# Patient Record
Sex: FEMALE | Race: WHITE | NOT HISPANIC OR LATINO | Employment: OTHER | URBAN - METROPOLITAN AREA
[De-identification: names, ages, dates, MRNs, and addresses within clinical notes are randomized per-mention and may not be internally consistent; named-entity substitution may affect disease eponyms.]

---

## 2022-10-19 PROBLEM — C54.1 ENDOMETRIAL CANCER: Status: ACTIVE | Noted: 2022-10-19

## 2022-10-19 NOTE — PROGRESS NOTES
New Patient Office Visit      Patient Name: Charanjit Cole  : 1955   MRN: [id number]      Referring Physician: Chel Mchugh DO    Chief Complaint:  Endometrial cancer    History of Present Illness: Charanjit Cole is a 66 y.o. female who is here today as a consultation with gynecologic oncology for a new diagnosis of endometrial cancer. She presented to Dr. Mchugh with complaints of a about a year long history of postmenopausal bleeding. Imaging demonstrated a thickened endometrial stripe. A D&C was performed that demonstrated the aforementioned diagnosis. Today, she is doing well. She does continue to have some pink spotting and vaginal discharge. She is otherwise without complaints.    Oncologic History:  Oncology/Hematology History   Endometrial cancer (HCC)   10/19/2022 Initial Diagnosis    Referred by Dr. Chel Mchugh    2022: Endometrial curettings with focal FIGO grade 1 endometrioid adenocarcinoma in a background of complex atypical hyperplasia.          Past Medical History:   Past Medical History:   Diagnosis Date   • Breast cancer (HCC)    • Endometrial cancer (HCC)    • Hypertension    • Palpitation    History of breast cancer treated with lumpectomy , mastectomy in  for reucrrence    Past Surgical History:   Past Surgical History:   Procedure Laterality Date   • BREAST LUMPECTOMY     • CARPAL TUNNEL RELEASE     • MASTECTOMY     • TUBAL ABDOMINAL LIGATION         Family History:   Family History   Problem Relation Age of Onset   • Stroke Mother    • Cardiomyopathy Brother    • Heart attack Brother    • Hodgkin's lymphoma Brother         non   • Stroke Sister        Social History:   Social History     Socioeconomic History   • Marital status:    Tobacco Use   • Smoking status: Never   • Smokeless tobacco: Never       Past OB/GYN History:   OB History   No obstetric history on file.   Menarche 11, menopause 53  ,   Denies any history of abnormal paps    Health  "Maintenance:   Mammogram: Date: History of breast cancer, last mammo 2021  Colonoscopy: Date: Unsure of dates, normal per patient    Medications:     Current Outpatient Medications:   •  citalopram (CeleXA) 20 MG tablet, , Disp: , Rfl:   •  digoxin (LANOXIN) 125 MCG tablet, , Disp: , Rfl:   •  donepezil (ARICEPT) 10 MG tablet, , Disp: , Rfl:   •  losartan (COZAAR) 100 MG tablet, Take 1 tablet by mouth Daily., Disp: , Rfl:   •  Magnesium Oxide 500 MG tablet, , Disp: , Rfl:   •  pantoprazole (PROTONIX) 40 MG EC tablet, , Disp: , Rfl:   •  potassium chloride 10 MEQ CR tablet, , Disp: , Rfl:   •  simvastatin (ZOCOR) 10 MG tablet, Take 1 tablet by mouth Every Night., Disp: , Rfl:   •  vitamin D (ERGOCALCIFEROL) 1.25 MG (68945 UT) capsule capsule, , Disp: , Rfl:     Allergies:   No Known Allergies    Review of Systems:   As per HPI, otherwise negative except chronic palpitations, swelling    Objective     Physical Exam:  Vital Signs:   Vitals:    10/20/22 1304   BP: 167/67   Pulse: 55   Resp: 15   Temp: 97.3 °F (36.3 °C)   TempSrc: Temporal   SpO2: 97%   Weight: 100 kg (220 lb 8 oz)   Height: 167.6 cm (66\")   PainSc: 0-No pain     BMI: Body mass index is 35.59 kg/m².   ECOG score: 0           PHQ-2 Depression Screening  Little interest or pleasure in doing things?     Feeling down, depressed, or hopeless?     PHQ-2 Total Score       Physical Examination:   General appearance - alert, well appearing, and in no distress and oriented to person, place, and time  Mental status - normal mood, behavior, speech, dress, motor activity, and thought processes  Eyes - sclera anicteric, left eye normal, right eye normal  Ears - right ear normal, left ear normal  Nose - mask  Mouth - mask  Lymphatics - no palpable lymphadenopathy, no hepatosplenomegaly  Lungs - normal respiratory effort, clear to auscultation  Heart - bradycardic, regular rhythm, normal S1, S2, no murmurs, rubs, clicks or gallops  Abdomen - obese, soft, nontender, " nondistended, no masses or organomegaly  Pelvic - external genitalia normal, vagina with moderate amount of discharge, cervix normal and without lesions, uterus small and mobile, adnexa without masses  Neurological - alert, oriented, normal speech, no focal findings or movement disorder noted  Musculoskeletal - no joint tenderness, deformity or swelling  Extremities - peripheral pulses normal, no pedal edema, no clubbing or cyanosis  Skin - normal coloration and turgor, no rashes, no suspicious skin lesions noted     Assessment / Plan    Charanjit Cole is a 66 y.o. year old female who is recently diagnosed with FIGO grade 1 endometrial cancer. We discussed surgical management via robotic hysterectomy, bilateral salpingo-oophorectomy with sentinel lymph node evaluation +/- complete lymphadenectomy as indicated. The patient was also counseled extensively on the nature of endometrial cancer and the fact that a majority are found in the early stages. However, if more advanced disease is encountered, she may require chemotherapy, radiation or a combination of the two. We also discussed risks of the procedure including bleeding, infection, wound breakdown, blood clots, injury to surrounding organs including the bowel, bladder, blood vessels, nerves, and ureters requiring additional intervention or procedures. We also discussed the risk of development of lymphedema particularly if a complete lymphadenectomy is needed. and the need for a laparotomy if the surgery cannot be performed safely via the minimally invasive approach. We also discussed the anticipated hospital stay and recovery time. All of the patient's questions were answered satisfactorily and verbal consent was obtained.    Anxiety: Continue citalopram perioperatively  Palpitations: Continue digoxin perioperatively. Will need EKG.  Hypertension: Severe range pressures today. Currently asymptomatic. Will need to hold losartan on day of surgery.  Cognitive changes:  Continue home donepezil.    Diagnoses and all orders for this visit:    1. Endometrial cancer (HCC) (Primary)  -     Case Request; Standing  -     CBC and Differential; Future  -     Comprehensive metabolic panel; Future  -     Type and screen; Future  -     ECG 12 Lead; Future  -     XR chest 1 vw; Future  -     Case Request    2. Anxiety    3. Primary hypertension    4. Cognitive changes    5. Palpitations    Other orders  -     Follow anesthesia standing orders.; Future  -     Obtain Informed Consent; Future  -     Provide NPO Instructions to Patient; Future  -     Chlorhexidine Skin Prep; Future  -     Follow anesthesia standing orders.; Standing  -     Obtain Informed Consent; Standing  -     Verify / Perform Chlorhexidine Skin Prep; Standing  -     Verify / Perform Chlorhexidine Skin Prep if Indicated (If Not Already Completed); Standing  -     lactated ringers infusion  -     ceFAZolin (ANCEF) 2 g in sodium chloride 0.9 % 100 mL IVPB  -     acetaminophen (TYLENOL) tablet 975 mg  -     heparin (porcine) 5000 UNIT/ML injection 5,000 Units  -     Insert Peripheral IV; Standing  -     Saline Lock & Maintain IV Access; Standing  -     sodium chloride 0.9 % flush 3 mL  -     sodium chloride 0.9 % flush 10 mL  -     gabapentin (NEURONTIN) capsule 300 mg  -     oxyCODONE (ROXICODONE) immediate release tablet 5 mg         Follow Up: Surgery    I spent 60 minutes caring for Charanjit on this date of service. This time includes time spent by me in the following activities: preparing for the visit, reviewing tests, performing a medically appropriate examination and/or evaluation, counseling and educating the patient/family/caregiver, ordering medications, tests, or procedures, referring and communicating with other health care professionals, documenting information in the medical record and care coordination      ABILIO Kent MD  Gynecologic Oncology

## 2022-10-20 ENCOUNTER — LAB (OUTPATIENT)
Dept: LAB | Facility: HOSPITAL | Age: 67
End: 2022-10-20

## 2022-10-20 ENCOUNTER — HOSPITAL ENCOUNTER (OUTPATIENT)
Dept: GENERAL RADIOLOGY | Facility: HOSPITAL | Age: 67
Discharge: HOME | End: 2022-10-20

## 2022-10-20 ENCOUNTER — HOSPITAL ENCOUNTER (OUTPATIENT)
Dept: CARDIOLOGY | Facility: HOSPITAL | Age: 67
Discharge: HOME | End: 2022-10-20

## 2022-10-20 ENCOUNTER — OFFICE VISIT (OUTPATIENT)
Dept: GYNECOLOGIC ONCOLOGY | Facility: CLINIC | Age: 67
End: 2022-10-20

## 2022-10-20 ENCOUNTER — TRANSCRIBE ORDERS (OUTPATIENT)
Dept: CARDIOLOGY | Facility: HOSPITAL | Age: 67
End: 2022-10-20

## 2022-10-20 VITALS
OXYGEN SATURATION: 97 % | HEART RATE: 55 BPM | DIASTOLIC BLOOD PRESSURE: 67 MMHG | HEIGHT: 66 IN | WEIGHT: 220.5 LBS | RESPIRATION RATE: 15 BRPM | TEMPERATURE: 97.3 F | BODY MASS INDEX: 35.44 KG/M2 | SYSTOLIC BLOOD PRESSURE: 167 MMHG

## 2022-10-20 DIAGNOSIS — C54.1 ENDOMETRIAL CANCER: Primary | ICD-10-CM

## 2022-10-20 DIAGNOSIS — C54.1 ENDOMETRIAL CANCER: ICD-10-CM

## 2022-10-20 DIAGNOSIS — R41.89 COGNITIVE CHANGES: ICD-10-CM

## 2022-10-20 DIAGNOSIS — F41.9 ANXIETY: ICD-10-CM

## 2022-10-20 DIAGNOSIS — I10 PRIMARY HYPERTENSION: ICD-10-CM

## 2022-10-20 DIAGNOSIS — R00.2 PALPITATIONS: ICD-10-CM

## 2022-10-20 LAB
ALBUMIN SERPL-MCNC: 4 G/DL (ref 3.5–5.2)
ALBUMIN/GLOB SERPL: 1.1 G/DL
ALP SERPL-CCNC: 99 U/L (ref 39–117)
ALT SERPL W P-5'-P-CCNC: 18 U/L (ref 1–33)
ANION GAP SERPL CALCULATED.3IONS-SCNC: 9 MMOL/L (ref 5–15)
AST SERPL-CCNC: 18 U/L (ref 1–32)
BASOPHILS # BLD AUTO: 0.04 10*3/MM3 (ref 0–0.2)
BASOPHILS NFR BLD AUTO: 0.4 % (ref 0–1.5)
BILIRUB SERPL-MCNC: 0.3 MG/DL (ref 0–1.2)
BUN SERPL-MCNC: 15 MG/DL (ref 8–23)
BUN/CREAT SERPL: 13.9 (ref 7–25)
CALCIUM SPEC-SCNC: 9.6 MG/DL (ref 8.6–10.5)
CHLORIDE SERPL-SCNC: 99 MMOL/L (ref 98–107)
CO2 SERPL-SCNC: 32 MMOL/L (ref 22–29)
CREAT SERPL-MCNC: 1.08 MG/DL (ref 0.57–1)
DEPRECATED RDW RBC AUTO: 46.5 FL (ref 37–54)
EGFRCR SERPLBLD CKD-EPI 2021: 56.1 ML/MIN/1.73
EOSINOPHIL # BLD AUTO: 0.17 10*3/MM3 (ref 0–0.4)
EOSINOPHIL NFR BLD AUTO: 1.9 % (ref 0.3–6.2)
ERYTHROCYTE [DISTWIDTH] IN BLOOD BY AUTOMATED COUNT: 13.7 % (ref 12.3–15.4)
GLOBULIN UR ELPH-MCNC: 3.5 GM/DL
GLUCOSE SERPL-MCNC: 183 MG/DL (ref 65–99)
HCT VFR BLD AUTO: 39.5 % (ref 34–46.6)
HGB BLD-MCNC: 12.7 G/DL (ref 12–15.9)
IMM GRANULOCYTES # BLD AUTO: 0.03 10*3/MM3 (ref 0–0.05)
IMM GRANULOCYTES NFR BLD AUTO: 0.3 % (ref 0–0.5)
LYMPHOCYTES # BLD AUTO: 2.55 10*3/MM3 (ref 0.7–3.1)
LYMPHOCYTES NFR BLD AUTO: 28.6 % (ref 19.6–45.3)
MCH RBC QN AUTO: 29.5 PG (ref 26.6–33)
MCHC RBC AUTO-ENTMCNC: 32.2 G/DL (ref 31.5–35.7)
MCV RBC AUTO: 91.9 FL (ref 79–97)
MONOCYTES # BLD AUTO: 0.7 10*3/MM3 (ref 0.1–0.9)
MONOCYTES NFR BLD AUTO: 7.9 % (ref 5–12)
NEUTROPHILS NFR BLD AUTO: 5.42 10*3/MM3 (ref 1.7–7)
NEUTROPHILS NFR BLD AUTO: 60.9 % (ref 42.7–76)
PLATELET # BLD AUTO: 223 10*3/MM3 (ref 140–450)
PMV BLD AUTO: 10 FL (ref 6–12)
POTASSIUM SERPL-SCNC: 4.3 MMOL/L (ref 3.5–5.2)
PROT SERPL-MCNC: 7.5 G/DL (ref 6–8.5)
QT INTERVAL: 396 MS
QTC INTERVAL: 398 MS
RBC # BLD AUTO: 4.3 10*6/MM3 (ref 3.77–5.28)
SODIUM SERPL-SCNC: 140 MMOL/L (ref 136–145)
WBC NRBC COR # BLD: 8.91 10*3/MM3 (ref 3.4–10.8)

## 2022-10-20 PROCEDURE — 93010 ELECTROCARDIOGRAM REPORT: CPT | Performed by: INTERNAL MEDICINE

## 2022-10-20 PROCEDURE — 99204 OFFICE O/P NEW MOD 45 MIN: CPT | Performed by: OBSTETRICS & GYNECOLOGY

## 2022-10-20 PROCEDURE — 80053 COMPREHEN METABOLIC PANEL: CPT

## 2022-10-20 PROCEDURE — 85025 COMPLETE CBC W/AUTO DIFF WBC: CPT

## 2022-10-20 PROCEDURE — 36415 COLL VENOUS BLD VENIPUNCTURE: CPT

## 2022-10-20 PROCEDURE — 71045 X-RAY EXAM CHEST 1 VIEW: CPT

## 2022-10-20 PROCEDURE — 93005 ELECTROCARDIOGRAM TRACING: CPT | Performed by: OBSTETRICS & GYNECOLOGY

## 2022-10-20 RX ORDER — SODIUM CHLORIDE 0.9 % (FLUSH) 0.9 %
3 SYRINGE (ML) INJECTION EVERY 12 HOURS SCHEDULED
Status: CANCELLED | OUTPATIENT
Start: 2022-10-20

## 2022-10-20 RX ORDER — GABAPENTIN 100 MG/1
300 CAPSULE ORAL ONCE
Status: CANCELLED | OUTPATIENT
Start: 2022-10-20 | End: 2022-10-20

## 2022-10-20 RX ORDER — SIMVASTATIN 10 MG
10 TABLET ORAL NIGHTLY
COMMUNITY

## 2022-10-20 RX ORDER — PANTOPRAZOLE SODIUM 40 MG/1
TABLET, DELAYED RELEASE ORAL
COMMUNITY
Start: 2022-09-01

## 2022-10-20 RX ORDER — BACLOFEN 20 MG
TABLET ORAL
COMMUNITY
Start: 2022-09-01

## 2022-10-20 RX ORDER — ERGOCALCIFEROL 1.25 MG/1
CAPSULE ORAL
COMMUNITY
Start: 2022-09-17

## 2022-10-20 RX ORDER — DONEPEZIL HYDROCHLORIDE 10 MG/1
TABLET, FILM COATED ORAL
COMMUNITY
Start: 2022-09-01

## 2022-10-20 RX ORDER — ACETAMINOPHEN 325 MG/1
975 TABLET ORAL ONCE
Status: CANCELLED | OUTPATIENT
Start: 2022-10-20 | End: 2022-10-20

## 2022-10-20 RX ORDER — POTASSIUM CHLORIDE 750 MG/1
TABLET, FILM COATED, EXTENDED RELEASE ORAL
COMMUNITY
Start: 2022-09-01

## 2022-10-20 RX ORDER — LOSARTAN POTASSIUM 100 MG/1
100 TABLET ORAL DAILY
COMMUNITY

## 2022-10-20 RX ORDER — HEPARIN SODIUM 5000 [USP'U]/ML
5000 INJECTION, SOLUTION INTRAVENOUS; SUBCUTANEOUS ONCE
Status: CANCELLED | OUTPATIENT
Start: 2022-10-20 | End: 2022-10-20

## 2022-10-20 RX ORDER — SODIUM CHLORIDE, SODIUM LACTATE, POTASSIUM CHLORIDE, CALCIUM CHLORIDE 600; 310; 30; 20 MG/100ML; MG/100ML; MG/100ML; MG/100ML
100 INJECTION, SOLUTION INTRAVENOUS CONTINUOUS
Status: CANCELLED | OUTPATIENT
Start: 2022-10-20

## 2022-10-20 RX ORDER — OXYCODONE HYDROCHLORIDE 5 MG/1
5 TABLET ORAL ONCE
Status: CANCELLED | OUTPATIENT
Start: 2022-10-20 | End: 2022-10-20

## 2022-10-20 RX ORDER — SODIUM CHLORIDE 0.9 % (FLUSH) 0.9 %
10 SYRINGE (ML) INJECTION AS NEEDED
Status: CANCELLED | OUTPATIENT
Start: 2022-10-20

## 2022-10-20 RX ORDER — DIGOXIN 125 MCG
TABLET ORAL
COMMUNITY
Start: 2022-10-09

## 2022-10-20 RX ORDER — CITALOPRAM 20 MG/1
TABLET ORAL
COMMUNITY
Start: 2022-09-01

## 2022-10-20 NOTE — PATIENT INSTRUCTIONS
"                           Surgery Instructions            Charanjit Cole  [id number]  1955      SURGEON:  Melanie Kent MD    Surgery Coordinator:      If you have any questions before or after surgery please call.  [phone]        Appointment      1. You have pre-admission testing (PAT) appointment on 11/01/2022  at 03:15 pm You will need to be at hospital registration 10 minutes before that time. The registration department is located in the long hallway between the [facility] and [address] [place] [address]. This is on the first floor of the [facility] hospital you can enter through the [address] [place] [address].      2.  Your surgery has been scheduled on 11/04/2022 You will need to go to [facility] to check in at 09:30 am They will then send up to the [address] [place] [address] second floor surgery area.    The Day(s) Before Surgery      Nothing by mouth after midnight on 11/03/2022    Plan to have someone take you home from the hospital.    Do not use any products that contain nicotine or tobacco, such as cigarettes and e-cigarettes. These can delay healing after surgery. If you need help quitting, ask your health care provider.     Do not take vitamins or aspirin one week before surgery ( if applicable). Do not take Ibuprofen or NSAIDs 5 days prior to Surgery. Do not take ACE or ARB medications for blood pressure the morning of surgery. If you are taking insulin, take 1/2 dose insulin the night prior to surgery.   Bring them with you to the hospital (Diabetic patient should bring insulin if instructed by the managing physician). If you are taking a blood thinner ( Eliquis, Coumadin, Xarelto, Lovenox, Asprin, Heparin, etc.) please have the provider that manages this instruct you on when to stop taking prior to surgery.     Continue antidepressants, Beta Blockers \"olol\", anti-seizure medication, GERD medication (heartburn), Opioids and Parkinson's medication.       If you are feeling sick, have a fever or cough and have seen your PCP let our " office know 48 hours prior to surgery. It may be subject to rescheduling.         The Day of Surgery:    Do not chew gum or tobacco, smoke, or eat mints or hard candy. Shower and wash your hair. You may brush your teeth but  do not swallow water. Use any wipes that Pre-admission testing has given you.     Please arrive for surgery as instructed by the pre-op nurse, often one to two hours before your surgery.     Remove all jewelry, including rings and piercings. Do not bring valuables to the hospital.     Wear loose-fitting clothing.     Avoid wearing eye makeup or contact lenses     Please remember to bring:  ? Photo ID and medical insurance card  ? Advanced directives, living will or power of  (if applicable)  ? Current list of all medications, including over-the- counter and herbal supplements  ? List of allergies  ? CPAP device if you have sleep apnea  ? Any assistive devices or equipment needed after surgery  ? Books/magazines to pass the time     When you arrive, check in at the surgery registration desk on the second floor of the [address] [place] [address].      Once you are called to go to your pre-op room, no one will be allowed in the pre op room.     Please note no one under age 12 is permitted to stay in the waiting area without supervision.    We make every effort to begin surgery at your scheduled start time but delays do occur. We will keep you and your family  updated about any delays.    While You are In the Pre-Op Room:   The nurse will review your health history and will place an IV (into the vein) in your hand or arm for fluids and  medicines.   An anesthesia provider will talk with you about anesthesia and pain control during and after surgery.   A member of the surgical team can answer your questions.             What can I expect after the procedure?    After Surgery and/or Discharge:    After surgery you will be moved to the recovery area. Recovery and discharge times will vary depending on the  procedure.    The recovery room nurse will provide your family/visitors with updates. Visitors are not permitted in the immediate postoperative recovery area, but your visitors will be notified when they can come to see you after surgery.    If you will not be admitted to the hospital, your nurse will assess your readiness to go home. This includes your:     Ability to walk, use crutches, etc.   Ability to urinate adequate amounts   Nausea and pain control    Before discharge, you will receive written instructions about how to care for yourself at home along with any prescriptions  for medications.     For your safety, you will need a responsible adult age 18 or older to accompany you home.     Please have a ride arranged and available when you are ready to leave. You cannot leave alone and it is recommended someone stay with you for the first 24 hours after anesthesia.       After the procedure, it is common to have:    Pain.  Soreness and numbness in your incision areas.  Vaginal bleeding and discharge up to 6 weeks after surgery.  Constipation.  Temporary change in bladder function.  Feelings of sadness or other emotions.  Small amounts of clear drainage from incisions  If you are discharged with an abdominal binder, this is to be used as needed for incisional comfort. You may choose to discontinue use at any time.           Follow these instructions at home:  Medicines    Please take any medications that have been prescribed after your surgery, you may take over the counter Tylenol and Ibuprofen, unless told otherwise by your provider.   Please take your stool softener as prescribed. If you do not have a bowel movement within 24 hours following surgery try a laxative (milk of magnesia) or you may take Liz-Lax.    Activity    Return to your normal activities as told by your health care provider.   You may be told to take short walks every day and go farther each time.  Do not lift anything that is heavier than  20 lbs.      General instructions    Do not put anything in your vagina for 6 weeks after your surgery or as told by your health care provider. This includes tampons and douches.  Do not have sex until your health care provider says you can.  Do not take baths, swim, or use a hot tub until your health care provider approves.  Drink enough fluid to keep your urine clear or pale yellow.  Do not drive for 24 hours if you were given a sedative.  Do not drive while taking Narcotic Pain medication ( oxycodone, hydrocodone, etc.).   Keep all follow-up visits as told by your health care provider. This is important. You will have a post op appointment typically 3 weeks after surgery.  Make sure you are urinating on a scheduled basis, for example every 2 hours. This will help retrain your bladder after surgery and prevent urinary tract infections.     Contact a health care provider if:    Your pain medicine is not helping.  You have a fever over 101.0  You have redness, swelling, or pain at your incision site.  You continue to have difficulty urinating.  You have not had a bowel movement 2-3 days after surgery, experience nausea and vomiting, are unable to pass gas.   Large volumes of drainage or blood from incisions, requiring multiple guaze changes.     Get help right away if:    You have severe abdominal or back pain that is not controlled with medication.  You have heavy bleeding from your vagina that saturates a maxi pad within 1-2 hours. Note- vaginal bleeding and spotting is normal up to 6 weeks from a hysterectomy, Heavy Bleeding is not.     If you experience a medical emergency call 911 or have someone drive you to your nearest emergency department.         Parking Information:    Free parking is available for patients and guests. There is  parking at the [address] in front of the hospital. Parking is also available in the [facility] and [facility].         Financial Assistance:    If you have any  questions or need assistance, contact your Psychiatric financial counseling office from 8:30 a.m.-4:30  p.m. Monday through Friday. Closed weekends.     [place]: [phone] or, or visit at [address] [facility], [address], near the entrance.        Patient Payments and Correspondence    Customer service representatives are available to assist you from 8:00 a.m. to 6:00 p.m. Eastern Standard Time by calling [phone] Monday through Friday. You can also contact us through [email or web address].    Psychiatric  [address]  [place] [address] [phone]

## 2022-11-01 ENCOUNTER — APPOINTMENT (OUTPATIENT)
Dept: PREADMISSION TESTING | Facility: HOSPITAL | Age: 67
End: 2022-11-01

## 2022-11-03 ENCOUNTER — ANESTHESIA EVENT (OUTPATIENT)
Dept: PERIOP | Facility: HOSPITAL | Age: 67
End: 2022-11-03

## 2022-11-03 ENCOUNTER — TELEPHONE (OUTPATIENT)
Dept: GYNECOLOGIC ONCOLOGY | Facility: CLINIC | Age: 67
End: 2022-11-03

## 2022-11-03 RX ORDER — FAMOTIDINE 10 MG/ML
20 INJECTION, SOLUTION INTRAVENOUS ONCE
Status: CANCELLED | OUTPATIENT
Start: 2022-11-03 | End: 2022-11-03

## 2022-11-04 ENCOUNTER — HOSPITAL ENCOUNTER (OUTPATIENT)
Facility: HOSPITAL | Age: 67
Setting detail: HOSPITAL OUTPATIENT SURGERY
Discharge: HOME | End: 2022-11-04
Attending: OBSTETRICS & GYNECOLOGY | Admitting: OBSTETRICS & GYNECOLOGY

## 2022-11-04 ENCOUNTER — ANESTHESIA (OUTPATIENT)
Dept: PERIOP | Facility: HOSPITAL | Age: 67
End: 2022-11-04

## 2022-11-04 VITALS
TEMPERATURE: 97.8 F | OXYGEN SATURATION: 92 % | HEART RATE: 66 BPM | HEIGHT: 66 IN | SYSTOLIC BLOOD PRESSURE: 142 MMHG | RESPIRATION RATE: 18 BRPM | BODY MASS INDEX: 35.44 KG/M2 | DIASTOLIC BLOOD PRESSURE: 89 MMHG | WEIGHT: 220.5 LBS

## 2022-11-04 DIAGNOSIS — C54.1 ENDOMETRIAL CANCER: ICD-10-CM

## 2022-11-04 LAB
GLUCOSE BLDC GLUCOMTR-MCNC: 287 MG/DL (ref 70–130)
GLUCOSE BLDC GLUCOMTR-MCNC: 325 MG/DL (ref 70–130)
GLUCOSE BLDC GLUCOMTR-MCNC: 370 MG/DL (ref 70–130)

## 2022-11-04 PROCEDURE — 94799 UNLISTED PULMONARY SVC/PX: CPT

## 2022-11-04 PROCEDURE — 25010000002 HEPARIN (PORCINE) PER 1000 UNITS: Performed by: OBSTETRICS & GYNECOLOGY

## 2022-11-04 PROCEDURE — 58571 TLH W/T/O 250 G OR LESS: CPT | Performed by: OBSTETRICS & GYNECOLOGY

## 2022-11-04 PROCEDURE — 58571 TLH W/T/O 250 G OR LESS: CPT | Performed by: PHYSICIAN ASSISTANT

## 2022-11-04 PROCEDURE — 88381 MICRODISSECTION MANUAL: CPT

## 2022-11-04 PROCEDURE — 94640 AIRWAY INHALATION TREATMENT: CPT

## 2022-11-04 PROCEDURE — 38900 IO MAP OF SENT LYMPH NODE: CPT | Performed by: OBSTETRICS & GYNECOLOGY

## 2022-11-04 PROCEDURE — 88309 TISSUE EXAM BY PATHOLOGIST: CPT | Performed by: OBSTETRICS & GYNECOLOGY

## 2022-11-04 PROCEDURE — 25010000002 CEFAZOLIN PER 500 MG: Performed by: OBSTETRICS & GYNECOLOGY

## 2022-11-04 PROCEDURE — 88305 TISSUE EXAM BY PATHOLOGIST: CPT | Performed by: OBSTETRICS & GYNECOLOGY

## 2022-11-04 PROCEDURE — 63710000001 INSULIN LISPRO (HUMAN) PER 5 UNITS

## 2022-11-04 PROCEDURE — 25010000002 FENTANYL CITRATE (PF) 100 MCG/2ML SOLUTION: Performed by: NURSE ANESTHETIST, CERTIFIED REGISTERED

## 2022-11-04 PROCEDURE — 63710000001 INSULIN REGULAR HUMAN PER 5 UNITS: Performed by: ANESTHESIOLOGY

## 2022-11-04 PROCEDURE — 38571 LAPAROSCOPY LYMPHADENECTOMY: CPT | Performed by: PHYSICIAN ASSISTANT

## 2022-11-04 PROCEDURE — 25010000002 NALOXONE PER 1 MG: Performed by: NURSE ANESTHETIST, CERTIFIED REGISTERED

## 2022-11-04 PROCEDURE — 82962 GLUCOSE BLOOD TEST: CPT

## 2022-11-04 PROCEDURE — 88342 IMHCHEM/IMCYTCHM 1ST ANTB: CPT | Performed by: OBSTETRICS & GYNECOLOGY

## 2022-11-04 PROCEDURE — 25010000002 HYDROMORPHONE 1 MG/ML SOLUTION

## 2022-11-04 PROCEDURE — 25010000002 PROPOFOL 10 MG/ML EMULSION: Performed by: NURSE ANESTHETIST, CERTIFIED REGISTERED

## 2022-11-04 PROCEDURE — 38900 IO MAP OF SENT LYMPH NODE: CPT | Performed by: PHYSICIAN ASSISTANT

## 2022-11-04 PROCEDURE — 88341 IMHCHEM/IMCYTCHM EA ADD ANTB: CPT | Performed by: OBSTETRICS & GYNECOLOGY

## 2022-11-04 PROCEDURE — 38571 LAPAROSCOPY LYMPHADENECTOMY: CPT | Performed by: OBSTETRICS & GYNECOLOGY

## 2022-11-04 PROCEDURE — 88307 TISSUE EXAM BY PATHOLOGIST: CPT | Performed by: OBSTETRICS & GYNECOLOGY

## 2022-11-04 PROCEDURE — 25010000002 DEXAMETHASONE PER 1 MG: Performed by: NURSE ANESTHETIST, CERTIFIED REGISTERED

## 2022-11-04 PROCEDURE — 25010000002 FENTANYL CITRATE (PF) 50 MCG/ML SOLUTION

## 2022-11-04 DEVICE — FLOSEAL HEMOSTATIC MATRIX, 5ML
Type: IMPLANTABLE DEVICE | Site: ABDOMEN | Status: FUNCTIONAL
Brand: FLOSEAL HEMOSTATIC MATRIX

## 2022-11-04 RX ORDER — FENTANYL CITRATE 50 UG/ML
INJECTION, SOLUTION INTRAMUSCULAR; INTRAVENOUS AS NEEDED
Status: DISCONTINUED | OUTPATIENT
Start: 2022-11-04 | End: 2022-11-04 | Stop reason: SURG

## 2022-11-04 RX ORDER — INDOCYANINE GREEN AND WATER 25 MG
KIT INJECTION AS NEEDED
Status: DISCONTINUED | OUTPATIENT
Start: 2022-11-04 | End: 2022-11-04 | Stop reason: HOSPADM

## 2022-11-04 RX ORDER — PROPOFOL 10 MG/ML
VIAL (ML) INTRAVENOUS AS NEEDED
Status: DISCONTINUED | OUTPATIENT
Start: 2022-11-04 | End: 2022-11-04 | Stop reason: SURG

## 2022-11-04 RX ORDER — MIDAZOLAM HYDROCHLORIDE 1 MG/ML
0.5 INJECTION INTRAMUSCULAR; INTRAVENOUS
Status: DISCONTINUED | OUTPATIENT
Start: 2022-11-04 | End: 2022-11-04 | Stop reason: HOSPADM

## 2022-11-04 RX ORDER — HEPARIN SODIUM 5000 [USP'U]/ML
5000 INJECTION, SOLUTION INTRAVENOUS; SUBCUTANEOUS ONCE
Status: COMPLETED | OUTPATIENT
Start: 2022-11-04 | End: 2022-11-04

## 2022-11-04 RX ORDER — SODIUM CHLORIDE 0.9 % (FLUSH) 0.9 %
10 SYRINGE (ML) INJECTION EVERY 12 HOURS SCHEDULED
Status: DISCONTINUED | OUTPATIENT
Start: 2022-11-04 | End: 2022-11-04 | Stop reason: HOSPADM

## 2022-11-04 RX ORDER — DEXAMETHASONE SODIUM PHOSPHATE 4 MG/ML
INJECTION, SOLUTION INTRA-ARTICULAR; INTRALESIONAL; INTRAMUSCULAR; INTRAVENOUS; SOFT TISSUE AS NEEDED
Status: DISCONTINUED | OUTPATIENT
Start: 2022-11-04 | End: 2022-11-04 | Stop reason: SURG

## 2022-11-04 RX ORDER — INSULIN LISPRO 100 [IU]/ML
INJECTION, SOLUTION INTRAVENOUS; SUBCUTANEOUS
Status: COMPLETED
Start: 2022-11-04 | End: 2022-11-04

## 2022-11-04 RX ORDER — HYDROMORPHONE HYDROCHLORIDE 1 MG/ML
0.5 INJECTION, SOLUTION INTRAMUSCULAR; INTRAVENOUS; SUBCUTANEOUS
Status: DISCONTINUED | OUTPATIENT
Start: 2022-11-04 | End: 2022-11-04 | Stop reason: HOSPADM

## 2022-11-04 RX ORDER — SODIUM CHLORIDE, SODIUM LACTATE, POTASSIUM CHLORIDE, CALCIUM CHLORIDE 600; 310; 30; 20 MG/100ML; MG/100ML; MG/100ML; MG/100ML
9 INJECTION, SOLUTION INTRAVENOUS CONTINUOUS
Status: DISCONTINUED | OUTPATIENT
Start: 2022-11-04 | End: 2022-11-04 | Stop reason: HOSPADM

## 2022-11-04 RX ORDER — ACETAMINOPHEN 325 MG/1
650 TABLET ORAL EVERY 4 HOURS PRN
Qty: 60 TABLET | Refills: 0 | Status: SHIPPED | OUTPATIENT
Start: 2022-11-04

## 2022-11-04 RX ORDER — PROMETHAZINE HYDROCHLORIDE 12.5 MG/1
12.5 TABLET ORAL ONCE AS NEEDED
Status: DISCONTINUED | OUTPATIENT
Start: 2022-11-04 | End: 2022-11-04 | Stop reason: HOSPADM

## 2022-11-04 RX ORDER — GLYCOPYRROLATE 0.2 MG/ML
INJECTION INTRAMUSCULAR; INTRAVENOUS AS NEEDED
Status: DISCONTINUED | OUTPATIENT
Start: 2022-11-04 | End: 2022-11-04 | Stop reason: SURG

## 2022-11-04 RX ORDER — FENTANYL CITRATE 50 UG/ML
50 INJECTION, SOLUTION INTRAMUSCULAR; INTRAVENOUS
Status: DISCONTINUED | OUTPATIENT
Start: 2022-11-04 | End: 2022-11-04 | Stop reason: HOSPADM

## 2022-11-04 RX ORDER — ACETAMINOPHEN 325 MG/1
975 TABLET ORAL ONCE
Status: COMPLETED | OUTPATIENT
Start: 2022-11-04 | End: 2022-11-04

## 2022-11-04 RX ORDER — BUPIVACAINE HYDROCHLORIDE AND EPINEPHRINE 5; 5 MG/ML; UG/ML
INJECTION, SOLUTION PERINEURAL AS NEEDED
Status: DISCONTINUED | OUTPATIENT
Start: 2022-11-04 | End: 2022-11-04 | Stop reason: HOSPADM

## 2022-11-04 RX ORDER — ROCURONIUM BROMIDE 10 MG/ML
INJECTION, SOLUTION INTRAVENOUS AS NEEDED
Status: DISCONTINUED | OUTPATIENT
Start: 2022-11-04 | End: 2022-11-04 | Stop reason: SURG

## 2022-11-04 RX ORDER — SODIUM CHLORIDE 9 MG/ML
INJECTION, SOLUTION INTRAVENOUS AS NEEDED
Status: DISCONTINUED | OUTPATIENT
Start: 2022-11-04 | End: 2022-11-04 | Stop reason: HOSPADM

## 2022-11-04 RX ORDER — LIDOCAINE HYDROCHLORIDE 10 MG/ML
0.5 INJECTION, SOLUTION EPIDURAL; INFILTRATION; INTRACAUDAL; PERINEURAL ONCE AS NEEDED
Status: COMPLETED | OUTPATIENT
Start: 2022-11-04 | End: 2022-11-04

## 2022-11-04 RX ORDER — FENTANYL CITRATE 50 UG/ML
INJECTION, SOLUTION INTRAMUSCULAR; INTRAVENOUS
Status: COMPLETED
Start: 2022-11-04 | End: 2022-11-04

## 2022-11-04 RX ORDER — ALBUTEROL SULFATE 2.5 MG/3ML
2.5 SOLUTION RESPIRATORY (INHALATION) ONCE AS NEEDED
Status: DISCONTINUED | OUTPATIENT
Start: 2022-11-04 | End: 2022-11-04 | Stop reason: HOSPADM

## 2022-11-04 RX ORDER — ONDANSETRON 2 MG/ML
4 INJECTION INTRAMUSCULAR; INTRAVENOUS ONCE AS NEEDED
Status: DISCONTINUED | OUTPATIENT
Start: 2022-11-04 | End: 2022-11-04 | Stop reason: HOSPADM

## 2022-11-04 RX ORDER — ONDANSETRON 4 MG/1
4 TABLET, FILM COATED ORAL ONCE AS NEEDED
Status: DISCONTINUED | OUTPATIENT
Start: 2022-11-04 | End: 2022-11-04 | Stop reason: HOSPADM

## 2022-11-04 RX ORDER — ALBUTEROL SULFATE 2.5 MG/3ML
2.5 SOLUTION RESPIRATORY (INHALATION) ONCE
Status: COMPLETED | OUTPATIENT
Start: 2022-11-04 | End: 2022-11-04

## 2022-11-04 RX ORDER — OXYCODONE HYDROCHLORIDE 5 MG/1
5 TABLET ORAL ONCE
Status: COMPLETED | OUTPATIENT
Start: 2022-11-04 | End: 2022-11-04

## 2022-11-04 RX ORDER — GABAPENTIN 300 MG/1
300 CAPSULE ORAL ONCE
Status: COMPLETED | OUTPATIENT
Start: 2022-11-04 | End: 2022-11-04

## 2022-11-04 RX ORDER — DOCUSATE SODIUM 250 MG
250 CAPSULE ORAL 2 TIMES DAILY
Qty: 60 CAPSULE | Refills: 0 | Status: SHIPPED | OUTPATIENT
Start: 2022-11-04

## 2022-11-04 RX ORDER — OXYCODONE HYDROCHLORIDE AND ACETAMINOPHEN 5; 325 MG/1; MG/1
1 TABLET ORAL EVERY 4 HOURS PRN
Qty: 5 TABLET | Refills: 0 | Status: SHIPPED | OUTPATIENT
Start: 2022-11-04

## 2022-11-04 RX ORDER — FAMOTIDINE 20 MG/1
20 TABLET, FILM COATED ORAL ONCE
Status: COMPLETED | OUTPATIENT
Start: 2022-11-04 | End: 2022-11-04

## 2022-11-04 RX ORDER — NALOXONE HYDROCHLORIDE 0.4 MG/ML
INJECTION, SOLUTION INTRAMUSCULAR; INTRAVENOUS; SUBCUTANEOUS AS NEEDED
Status: DISCONTINUED | OUTPATIENT
Start: 2022-11-04 | End: 2022-11-04 | Stop reason: SURG

## 2022-11-04 RX ORDER — ALBUTEROL SULFATE 2.5 MG/3ML
SOLUTION RESPIRATORY (INHALATION)
Status: COMPLETED
Start: 2022-11-04 | End: 2022-11-04

## 2022-11-04 RX ORDER — IBUPROFEN 600 MG/1
600 TABLET ORAL EVERY 6 HOURS PRN
Status: DISCONTINUED | OUTPATIENT
Start: 2022-11-04 | End: 2022-11-04 | Stop reason: HOSPADM

## 2022-11-04 RX ORDER — ASPIRIN 81 MG/1
81 TABLET, CHEWABLE ORAL DAILY
COMMUNITY

## 2022-11-04 RX ORDER — SODIUM CHLORIDE 0.9 % (FLUSH) 0.9 %
10 SYRINGE (ML) INJECTION AS NEEDED
Status: DISCONTINUED | OUTPATIENT
Start: 2022-11-04 | End: 2022-11-04 | Stop reason: HOSPADM

## 2022-11-04 RX ORDER — PROMETHAZINE HYDROCHLORIDE 12.5 MG/1
12.5 SUPPOSITORY RECTAL ONCE AS NEEDED
Status: DISCONTINUED | OUTPATIENT
Start: 2022-11-04 | End: 2022-11-04 | Stop reason: HOSPADM

## 2022-11-04 RX ORDER — BUPIVACAINE HCL/0.9 % NACL/PF 0.1 %
2 PLASTIC BAG, INJECTION (ML) EPIDURAL ONCE
Status: COMPLETED | OUTPATIENT
Start: 2022-11-04 | End: 2022-11-04

## 2022-11-04 RX ORDER — MAGNESIUM HYDROXIDE 1200 MG/15ML
LIQUID ORAL AS NEEDED
Status: DISCONTINUED | OUTPATIENT
Start: 2022-11-04 | End: 2022-11-04 | Stop reason: HOSPADM

## 2022-11-04 RX ORDER — LIDOCAINE HYDROCHLORIDE 10 MG/ML
INJECTION, SOLUTION EPIDURAL; INFILTRATION; INTRACAUDAL; PERINEURAL AS NEEDED
Status: DISCONTINUED | OUTPATIENT
Start: 2022-11-04 | End: 2022-11-04 | Stop reason: SURG

## 2022-11-04 RX ADMIN — HEPARIN SODIUM 5000 UNITS: 5000 INJECTION, SOLUTION INTRAVENOUS; SUBCUTANEOUS at 08:43

## 2022-11-04 RX ADMIN — OXYCODONE 5 MG: 5 TABLET ORAL at 08:40

## 2022-11-04 RX ADMIN — FENTANYL CITRATE 50 MCG: 50 INJECTION, SOLUTION INTRAMUSCULAR; INTRAVENOUS at 13:10

## 2022-11-04 RX ADMIN — HYDROMORPHONE HYDROCHLORIDE 0.5 MG: 1 INJECTION, SOLUTION INTRAMUSCULAR; INTRAVENOUS; SUBCUTANEOUS at 13:25

## 2022-11-04 RX ADMIN — PROPOFOL 150 MG: 10 INJECTION, EMULSION INTRAVENOUS at 09:37

## 2022-11-04 RX ADMIN — FENTANYL CITRATE 50 MCG: 50 INJECTION, SOLUTION INTRAMUSCULAR; INTRAVENOUS at 13:35

## 2022-11-04 RX ADMIN — GLYCOPYRROLATE 0.2 MCG: 0.2 INJECTION INTRAMUSCULAR; INTRAVENOUS at 09:57

## 2022-11-04 RX ADMIN — ROCURONIUM BROMIDE 50 MG: 10 INJECTION INTRAVENOUS at 09:37

## 2022-11-04 RX ADMIN — FAMOTIDINE 20 MG: 20 TABLET ORAL at 08:40

## 2022-11-04 RX ADMIN — INSULIN LISPRO 4 UNITS: 100 INJECTION, SOLUTION INTRAVENOUS; SUBCUTANEOUS at 12:24

## 2022-11-04 RX ADMIN — ROCURONIUM BROMIDE 20 MG: 10 INJECTION INTRAVENOUS at 10:16

## 2022-11-04 RX ADMIN — ACETAMINOPHEN 1000 MG: 500 TABLET ORAL at 08:39

## 2022-11-04 RX ADMIN — INSULIN HUMAN 5 UNITS: 100 INJECTION, SOLUTION PARENTERAL at 08:44

## 2022-11-04 RX ADMIN — HYDROMORPHONE HYDROCHLORIDE 0.5 MG: 1 INJECTION, SOLUTION INTRAMUSCULAR; INTRAVENOUS; SUBCUTANEOUS at 13:18

## 2022-11-04 RX ADMIN — LIDOCAINE HYDROCHLORIDE 50 MG: 10 INJECTION, SOLUTION EPIDURAL; INFILTRATION; INTRACAUDAL; PERINEURAL at 09:37

## 2022-11-04 RX ADMIN — SODIUM CHLORIDE, POTASSIUM CHLORIDE, SODIUM LACTATE AND CALCIUM CHLORIDE 500 ML: 600; 310; 30; 20 INJECTION, SOLUTION INTRAVENOUS at 12:59

## 2022-11-04 RX ADMIN — SODIUM CHLORIDE, POTASSIUM CHLORIDE, SODIUM LACTATE AND CALCIUM CHLORIDE 9 ML/HR: 600; 310; 30; 20 INJECTION, SOLUTION INTRAVENOUS at 08:15

## 2022-11-04 RX ADMIN — NALXONE HYDROCHLORIDE 0.08 MG: 0.4 INJECTION INTRAMUSCULAR; INTRAVENOUS; SUBCUTANEOUS at 11:42

## 2022-11-04 RX ADMIN — ALBUTEROL SULFATE 2.5 MG: 2.5 SOLUTION RESPIRATORY (INHALATION) at 14:17

## 2022-11-04 RX ADMIN — GLYCOPYRROLATE 0.2 MCG: 0.2 INJECTION INTRAMUSCULAR; INTRAVENOUS at 10:01

## 2022-11-04 RX ADMIN — DEXAMETHASONE SODIUM PHOSPHATE 8 MG: 4 INJECTION, SOLUTION INTRAMUSCULAR; INTRAVENOUS at 09:37

## 2022-11-04 RX ADMIN — SODIUM CHLORIDE, POTASSIUM CHLORIDE, SODIUM LACTATE AND CALCIUM CHLORIDE: 600; 310; 30; 20 INJECTION, SOLUTION INTRAVENOUS at 10:28

## 2022-11-04 RX ADMIN — LIDOCAINE HYDROCHLORIDE 0.2 ML: 10 INJECTION, SOLUTION EPIDURAL; INFILTRATION; INTRACAUDAL; PERINEURAL at 08:15

## 2022-11-04 RX ADMIN — FENTANYL CITRATE 100 MCG: 50 INJECTION, SOLUTION INTRAMUSCULAR; INTRAVENOUS at 09:37

## 2022-11-04 RX ADMIN — GABAPENTIN 300 MG: 300 CAPSULE ORAL at 08:40

## 2022-11-04 RX ADMIN — Medication 2 G: at 09:37

## 2022-11-04 NOTE — INTERVAL H&P NOTE
[facility] Pre-op    Full history and physical note from office is attached.    VS: /92  HR 64  RR 16  T 97.0  Sat 93%RA    Immunizations:  Influenza:  No  Pneumococcal:  No  Tetanus:  UTD  Covid x3: 2021    Review of Systems:  Constitutional-- No fever, chills or sweats. No fatigue.  CV-- No chest pain, palpitation or syncope  Resp-- No SOB, cough, hemoptysis  Skin--No rashes or lesions    LAB Results:  Lab Results   Component Value Date    WBC 8.91 10/20/2022    HGB 12.7 10/20/2022    HCT 39.5 10/20/2022    MCV 91.9 10/20/2022     10/20/2022    NEUTROABS 5.42 10/20/2022    GLUCOSE 183 (H) 10/20/2022    BUN 15 10/20/2022    CREATININE 1.08 (H) 10/20/2022     10/20/2022    K 4.3 10/20/2022    CL 99 10/20/2022    CO2 32.0 (H) 10/20/2022    CALCIUM 9.6 10/20/2022    ALBUMIN 4.00 10/20/2022    AST 18 10/20/2022    ALT 18 10/20/2022    BILITOT 0.3 10/20/2022       Cancer Staging (if applicable)  Cancer Patient: __ yes __no __unknown__N/A; If yes, clinical stage T:__ N:__M:__, stage group or __N/A      Impression: Endometrial cancer      Plan: TOTAL LAPAROSCOPIC HYSTERECTOMY BILATERAL SALPINGO-OOPHORECTOMY, INJECTION FOR SENTINEL LYMPH NODE MAPPING, BILATERAL SENTINEL LYMPH NODE DISSECTION (POSSIBLE FULL LYMPH NODE DISSECTION) WITH DAVINCI ROBOT      GUY Dudley   11/4/2022   08:02 EDT

## 2022-11-04 NOTE — ANESTHESIA PREPROCEDURE EVALUATION
Anesthesia Evaluation     Patient summary reviewed and Nursing notes reviewed   no history of anesthetic complications:  NPO Solid Status: > 8 hours  NPO Liquid Status: > 2 hours           Airway   Mallampati: II  TM distance: >3 FB  Neck ROM: full  Small opening and Possible difficult intubation  Dental - normal exam     Pulmonary - normal exam   (-) sleep apnea, not a smoker  Cardiovascular   Exercise tolerance: good (4-7 METS)    ECG reviewed  Rhythm: regular  Rate: normal    (+) hypertension, dysrhythmias Paroxysmal Atrial Fib, murmur,   (-) past MI, CAD, cardiac stents    ROS comment: NSR     Neuro/Psych  (-) seizures, CVA  GI/Hepatic/Renal/Endo    (+)  GERD well controlled,      Musculoskeletal     Abdominal    Substance History - negative use     OB/GYN          Other      history of cancer (endometrial ca)    ROS/Med Hx Other: 9[facility]20[id number]  bg [facility] --> insulin now  hgb 12.7 k 4.3                Anesthesia Plan    ASA 3     general     (Risks and benefits of general anesthesia discussed with patient (including MI, CVA, death, recall, aspiration, oropharyngeal/dental damage), questions answered, agreeable to proceed.    )  intravenous induction     Anesthetic plan, risks, benefits, and alternatives have been provided, discussed and informed consent has been obtained with: patient.    Use of blood products discussed with patient  Consented to blood products.   Plan discussed with CRNA.        CODE STATUS:

## 2022-11-04 NOTE — OP NOTE
Operative Note     Patient Name: Charanjit Cole  : 1955   MRN: [id number]   Date: 22  Time: 11: EDT    Date of Service:  22  Time of Service:  11: EDT    Surgical Staff: Surgeon(s) and Role:     * Melanie Kent MD - Primary     * Yair Hermosillo MD - Resident - Assisting   Additional Staff:   Assistant: Kevin Boyd PA-C; German Morales PA-C  was responsible for performing the following activities: Retraction, Suction, Irrigation, Suturing, Closing, Placing Dressing and Held/Positioned Camera and their skilled assistance was necessary for the success of this case.     Pre-operative diagnosis(es): Pre-Op Diagnosis Codes:     * Endometrial cancer (HCC) [C54.1]     Post-operative diagnosis(es): Post-Op Diagnosis Codes:     * Endometrial cancer (HCC) [C54.1]   Procedure(s): Procedure(s):  TOTAL LAPAROSCOPIC HYSTERECTOMY BILATERAL SALPINGO-OOPHORECTOMY, INJECTION FOR SENTINEL LYMPH NODE MAPPING, BILATERAL PELVIC SENTINEL LYMPH NODE DISSECTION  WITH DAVINCI ROBOT     Antibiotics: cefazolin (Ancef) ordered on call to OR     Anesthesia: Type: General  ASA:  III     Objective      Operative findings: Normal appearing cervix. Uterus sounded to 8 cm. Normal-appearing bilateral adnexa. No evidence of extrauterine disease. Bilateral sentinel lymph node mapping to the external iliac spaces. Upper abdominal survey normal.   Specimens removed: ID Type Source Tests Collected by Time   A (Not marked as sent) : RIGHT PELVIC SENTINEL LYMPH NODE FOR PERMANENT Tissue Pelvis TISSUE PATHOLOGY EXAM Melanie Kent MD 2022 1016   B (Not marked as sent) : LEFT PELVIC SENTINEL LYMPH NODE FOR PERMANENT Tissue Pelvis TISSUE PATHOLOGY EXAM Melanie Kent MD 2022 1016   C (Not marked as sent) :  Tissue Uterus with Cervix, Bilateral Tubes and Ovaries TISSUE PATHOLOGY EXAM Melanie Kent MD 2022 1008   D (Not marked as sent) : RIGHT CERVICAL MARGIN FOR PERMANENT Tissue Cervix TISSUE  PATHOLOGY EXAM Melanie Kent MD 11/4/2022 1059      Fluid Intake and Output: I/O this shift:  In: 1000 [I.V.:1000]  Out: -  EBL 50 cc   Blood products used: No   Drains: Urethral Catheter Silicone 16 Fr. (Active)      Implant Information: Nothing was implanted during the procedure   Complications: None   Intraoperative consult(s): None   Condition: stable   Disposition: to PACU and then discharge home     INDICATIONS FOR PROCEDURE:  Charanjit Cole is a 67 y.o. female with a history of postmenopausal bleeding and uterine sampling demonstrating FIGO grade endometrial cancer. She was counseled regarding options and she desired to proceed with hysterectomy, BSO and staging by a robotic approach. Questions were answered and consent was signed.      DESCRIPTION OF PROCEDURE:  The patient was taken to the operating room after the sites had been marked. After a time out procedure was performed, and the planned procedure and patient were confirmed, she was placed under general anesthesia with endotracheal intubation  She received prophylactic antibiotics prior to skin incision.  She was placed in the dorsal lithotomy position in the [facility] and prepared and draped in normal sterile fashion. A jorgensen catheter was placed sterilely.     A supraumbilical incision was then made with a scalpel. The peritoneal cavity was entered with the Veress needle. Correct placement of the Veress needle into the peritoneal cavity was confirmed by a drop in pressure. The abdomen was insufflated to a pressure of 15 mmHg. An 8-mm endoscope trocar was then placed through the incision with the findings as noted. Three 8-mm trocars were placed in the left and right upper quadrants. A 8-mm endoscopic trocar was placed in the right upper quadrant. The patient was placed in Trendelenburg and the bowel packed into the upper abdomen. The aforementioned findings were noted. The robot was then docked and the instruments placed under direct  visualization.      Prior to beginning the hysterectomy, a speculum was placed into the vagina and ICG dye injected into the cervix. A Heather manipulator (8 cm tip and 3 cm cup) with pneumo-occluder was placed in the vagina.     Attention was first turned turned to the pelvic sentinel lymphadenectomy.  The sigmoid colon was adherent to the left pelvic sidewall and adnexa.  Adhesions were lysed using a combination of blunt and sharp dissection.  The retroperitoneum was opened bilaterally and the pararectal and paravesical spaces were developed.  On the right, she had a sentinel lymph node in the external iliac space.  The obturator nerve and ureter were identified and kept out of harm's way.  The lymph node was then carefully dissected free and sent for pathologic evaluation (permanent).  On the left, a sentinel node was identified in the external iliac space. The ureter and obturator nerve was identified and kept out of harm's way.  The sentinel lymph node was then dissected free from surrounding tissue and sent for pathology.  Hemostasis was confirmed. Of note, all nodes were removed in bags through the 8-mm right sided port.     The round ligament was grasped, and the peritoneum over the sidewall was opened sharply.  The retroperitoneal space was then further developed, and the ureter was identified.  The peritoneum between the ureter and the infundibulopelvic ligament was then incised and a window was created.  The ovarian vessels were then thoroughly coagulated and transected.  The round ligament was then transected, and the vesicouterine peritoneum was further skeletonized to the midline. Similar steps were performed on the left side where also the retroperitoneum was opened, the ureter was visualized, and a window was then created between the ovarian vessels and the ureter prior to coagulating and transecting the ovarian vessels. The vesicouterine peritoneum was then further skeletonized, and the vaginal  manipulator was further inserted to identify the vagina and to allow dissection of the bladder off of the vagina. Next the uterine vessels were carefully skeletonized before being coagulated and transected.  Subsequent pedicles were created closely along the cervix of the cardinal ligament and uterosacral ligaments. A colpotomy was then made until the uterus was fully removed from the vagina and the uterus handed off for permanent pathology. The vaginal cuff was closed with figure of eight 0-Vicryls.     We again inspected all areas. There was bleeding noted from the right retroperitoneum. The ureter was identified and followed to the tunnel under the uterine vessels. Once the ureter was retraced medially, there was bleeding identified coming from several small aberrant veins. These were sealed with the Synchroseal and Floseal placed on top. Hemostasis was then noted, and no instruments, needles or sponges were left in the abdomen. The bladder was backfilled with 120 cc of sterile saline and the jorgensen catheter removed. The robotic instruments were removed and the robot undocked. Skin incisions were closed with 4-0 monocryl in a subcuticular fashion, and skin adhesive was placed over these incisions. Sponge, lap and needle counts were correct x2.  The patient was then woken up from anesthesia and taken to PACU in stable condition.

## 2022-11-04 NOTE — ANESTHESIA PROCEDURE NOTES
Airway  Urgency: elective    Date/Time: 11/4/2022 9:40 AM  Airway not difficult    General Information and Staff    Patient location during procedure: OR  CRNA/CAA: Geovanni Adames CRNA    Indications and Patient Condition  Indications for airway management: airway protection    Preoxygenated: yes  MILS not maintained throughout  Mask difficulty assessment: 2 - vent by mask + OA or adjuvant +/- NMBA    Final Airway Details  Final airway type: endotracheal airway      Successful airway: ETT  Cuffed: yes   Successful intubation technique: direct laryngoscopy  Endotracheal tube insertion site: oral  Blade: Portillo  Blade size: 2  ETT size (mm): 7.0  Cormack-Lehane Classification: grade I - full view of glottis  Placement verified by: chest auscultation and capnometry   Cuff volume (mL): 5  Measured from: lips  ETT/EBT  to lips (cm): 21  Number of attempts at approach: 1  Assessment: lips, teeth, and gum same as pre-op and atraumatic intubation    Additional Comments  Negative epigastric sounds, Breath sound equal bilaterally with symmetric chest rise and fall

## 2022-11-07 ENCOUNTER — TELEPHONE (OUTPATIENT)
Dept: GYNECOLOGIC ONCOLOGY | Facility: CLINIC | Age: 67
End: 2022-11-07

## 2022-11-07 NOTE — TELEPHONE ENCOUNTER
I called the patient to check on her after surgery discharge.     She reports the following:      Pain level: 0-10: 3    Urination: Voiding: without difficulty    Bowel Movement/Passing gas: flatus/bowel movement: bowel movement    Nausea and Vomiting:Nausea/vomiting: no nausea and no vomiting    Ambulating: post-op home ambulation: Ambulating    Fever:fever and chills: no fever or chills    Eating and Drinking:Post Op eating and drinking: tolerating regular diet    Vaginal bleeding?( S/P Hysterectomy ):desc; vaginal bleeding  yes/no: Denies vaginal bleeding    Incisions ( if indicated ) :     Additional Notes or Education:  NONE: none    Provider notified if patient had any of the preceding problems and patient was advised per provider on how to manage. Patient verbalized understanding and will call back if they have any other concerns before post operative appointment.

## 2022-11-11 ENCOUNTER — TELEPHONE (OUTPATIENT)
Dept: GYNECOLOGIC ONCOLOGY | Facility: CLINIC | Age: 67
End: 2022-11-11

## 2022-11-11 NOTE — TELEPHONE ENCOUNTER
----- Message from Melanie Kent MD sent at 11/11/2022 10:33 AM EST -----  Please let Charanjit Cole know that her pathology showed a stage IA, grade 2 endometrial cancer. Given these findings no further is recommended. I will see her at her postoperative appointment to discuss this in more detail.

## 2022-11-21 ENCOUNTER — OFFICE VISIT (OUTPATIENT)
Dept: GYNECOLOGIC ONCOLOGY | Facility: CLINIC | Age: 67
End: 2022-11-21

## 2022-11-21 VITALS
TEMPERATURE: 97.9 F | DIASTOLIC BLOOD PRESSURE: 59 MMHG | WEIGHT: 222 LBS | HEART RATE: 75 BPM | OXYGEN SATURATION: 93 % | SYSTOLIC BLOOD PRESSURE: 153 MMHG | RESPIRATION RATE: 16 BRPM | BODY MASS INDEX: 35.83 KG/M2

## 2022-11-21 DIAGNOSIS — Z09 POSTOP CHECK: Primary | ICD-10-CM

## 2022-11-21 PROCEDURE — 99024 POSTOP FOLLOW-UP VISIT: CPT | Performed by: OBSTETRICS & GYNECOLOGY

## 2022-11-21 NOTE — PROGRESS NOTES
Post Operative Office Visit      Patient Name: Charanjit Cole  : 1955   MRN: [id number]     Chief Complaint:  Postoperative visit    History of Present Illness: Charanjit Cole is a 67 y.o. female who is status post Total Laparoscopic Hysterectomy Bilateral Salpingo-oophorectomy, Injection For [place] Lymph Node Mapping, Bilateral Pelvic [place] Lymph Node Dissection  With Davinci Robot on 2022 for endometrial cancer. Her post operative course has been unremarkable and continues to be recovering well. She is tolerating a normal diet. She reports normal return of bowel function. She states her pain is well controlled.     Medical, surgical, and family history updated as needed.  Subjective   ROS as per HPI    Answers for HPI/ROS submitted by the patient on 2022  What is the primary reason for your visit?: Physical      Objective     Physical Exam:  Vital Signs:   Vitals:    22 1015   BP: 153/59   Pulse: 75   Resp: 16   Temp: 97.9 °F (36.6 °C)   SpO2: 93%   Weight: 101 kg (222 lb)   PainSc: 0-No pain     BMI: Body mass index is 35.83 kg/m².     Physical Examination:   General appearance - alert, well appearing, and in no distress and oriented to person, place, and time  Mental status - normal mood, behavior, speech, dress, motor activity, and thought processes  Lungs - normal respiratory effort, clear to auscultation  Heart - normal rate, regular rhythm, normal S1, S2, no murmurs, rubs, clicks or gallops  Abdomen - soft, nontender, nondistended, no masses or organomegaly, incision C/D/I and without an evidence of infection  Pelvic - external genitalia normal, vagina without discharge, vaginal cuff intact and without lesions, cervix, uterus and adnexa surgically absent, no palpable masses  Neurological - alert, oriented, normal speech, no focal findings or movement disorder noted  Musculoskeletal - no joint tenderness, deformity or swelling  Extremities - peripheral pulses normal, no pedal  edema, no clubbing or cyanosis  Skin - normal coloration and turgor, no rashes, no suspicious skin lesions noted     Medications:     Current Outpatient Medications:   •  acetaminophen (TYLENOL) 325 MG tablet, Take 2 tablets by mouth Every 4 (Four) Hours As Needed for Mild Pain., Disp: 60 tablet, Rfl: 0  •  aspirin 81 MG chewable tablet, Chew 1 tablet Daily., Disp: , Rfl:   •  CARVEDILOL PO, Take  by mouth 2 (Two) Times a Day., Disp: , Rfl:   •  citalopram (CeleXA) 20 MG tablet, , Disp: , Rfl:   •  digoxin (LANOXIN) 125 MCG tablet, , Disp: , Rfl:   •  docusate sodium (COLACE) 250 MG capsule, Take 1 capsule by mouth 2 (Two) Times a Day. Take while requiring narcotics, Disp: 60 capsule, Rfl: 0  •  donepezil (ARICEPT) 10 MG tablet, , Disp: , Rfl:   •  Furosemide (LASIX PO), Take  by mouth. Takes PRN, Disp: , Rfl:   •  losartan (COZAAR) 100 MG tablet, Take 1 tablet by mouth Daily., Disp: , Rfl:   •  Magnesium Oxide 500 MG tablet, , Disp: , Rfl:   •  oxyCODONE-acetaminophen (PERCOCET) 5-325 MG per tablet, Take 1 tablet by mouth Every 4 (Four) Hours As Needed (Pain)., Disp: 5 tablet, Rfl: 0  •  pantoprazole (PROTONIX) 40 MG EC tablet, , Disp: , Rfl:   •  potassium chloride 10 MEQ CR tablet, , Disp: , Rfl:   •  simvastatin (ZOCOR) 10 MG tablet, Take 1 tablet by mouth Every Night., Disp: , Rfl:   •  vitamin D (ERGOCALCIFEROL) 1.25 MG (11292 UT) capsule capsule, , Disp: , Rfl:   Current outpatient and discharge medications have been reconciled for the patient.  Reviewed by: Melanie Kent MD      Allergies:   No Known Allergies    Pathology:   1.  LYMPH NODE, RIGHT PELVIC, SENTINEL NODE EXCISION:  One lymph node negative for metastatic carcinoma, supported by immunohistochemical stain for cytokeratin YESSENIA with appropriate control (0/1).     2.  LYMPH NODE, LEFT PELVIC, SENTINEL NODE EXCISION:  One lymph node negative for metastatic carcinoma, supported by immunohistochemical stain for cytokeratin YESSENIA with appropriate  "control (0/1).    3.  UTERUS, CERVIX, BILATERAL OVARIES AND FALLOPIAN TUBES, HYSTERECTOMY AND BILATERAL SALPINGO-OOPHORECTOMY:  Endometrioid adenocarcinoma, FIGO grade 2, invasive 25% of myometrial thickness.  P53 wild-type by immunohistochemical staining.  No involvement of cervical stroma, parametrium or bilateral adnexa.  See tumor synoptic for additional details.    4.  SPECIMEN SUBMITTED AS \"RIGHT CERVICAL MARGIN:  Benign cervical tissue with no tumor identified.    Operative findings again reviewed. Pathology report discussed.       Assessment / Plan    Charanjit Cole is a 67 y.o. who underwent a robotic-assisted total laparoscopic hysterectomy with bilateral salpingo-oophorectomy and bilateral pelvic sentinel lymph node dissection. She is recovering well from surgery. We discussed continuing postoperative restrictions. Her pathology report was reviewed and demonstrates a Stage IA (25% myometrial invasion), grade 2 endometrial cancer with no LVSI. She therefore has a low risk of recurrence. Therefore, surgery is considered definitive and she will require no further therapy. She has been advised that she will require continued surveillance with pelvic examinations, but will no longer require cytology (Pap tests). She was also counseled on possible symptoms of recurrence including vaginal bleeding, pelvic pain, changes in bowel and bladder symptoms, or shortness of breath. She will follow up in 6 months time, but aware that if she experiences any of the above symptoms that she should return for immediate evaluation.  She will also be scheduled for a Survivorship visit with our APRN in 3 month.     I spent 10 minutes with the patient in face-to-face discussion and counseling regarding the significance of the pathology report, future treatment options, and surveillance for her newly diagnosed cancer. This is above and beyond her routine post-operative care and separate from the procedure performed.        Follow " Up:   No follow-ups on file.    ABILIO Kent MD  Gynecologic Oncology

## 2022-11-25 LAB
CYTO UR: NORMAL
LAB AP CASE REPORT: NORMAL
LAB AP CLINICAL INFORMATION: NORMAL
LAB AP INTEGRATED ONCOLOGY, ADDENDUM: NORMAL
LAB AP SPECIAL STAINS: NORMAL
PATH REPORT.FINAL DX SPEC: NORMAL
PATH REPORT.GROSS SPEC: NORMAL

## 2023-03-07 ENCOUNTER — OFFICE VISIT (OUTPATIENT)
Dept: GYNECOLOGIC ONCOLOGY | Facility: CLINIC | Age: 68
End: 2023-03-07
Payer: MEDICARE

## 2023-03-07 VITALS
HEIGHT: 66 IN | TEMPERATURE: 97.1 F | BODY MASS INDEX: 35.18 KG/M2 | SYSTOLIC BLOOD PRESSURE: 129 MMHG | DIASTOLIC BLOOD PRESSURE: 57 MMHG | OXYGEN SATURATION: 96 % | WEIGHT: 218.9 LBS | HEART RATE: 58 BPM | RESPIRATION RATE: 18 BRPM

## 2023-03-07 DIAGNOSIS — R15.2 FECAL URGENCY: ICD-10-CM

## 2023-03-07 DIAGNOSIS — Z85.42 HISTORY OF ENDOMETRIAL CANCER: Primary | ICD-10-CM

## 2023-03-07 PROCEDURE — 99215 OFFICE O/P EST HI 40 MIN: CPT | Performed by: NURSE PRACTITIONER

## 2023-03-07 NOTE — PROGRESS NOTES
"GYN ONCOLOGY CANCER SURVIVORSHIP VISIT    Charanjit Cole  [id number]  1955    Subjective   Chief Complaint: Uterine Cancer (Survivorship)        History of present illness:     Charanjit Cole is a 67 y.o. year old female who is here today for her Cancer Survivorship visit, see oncology history below. She is accompanied by her niece. She is feeling generally well today. She recovered well from surgery, denies any incision trouble or pain. She denies vaginal bleeding or discharge. Bladder is working well. She does note some increased bowel urgency since her surgery. She has had a few occurrences of bowel incontinence \"without warning\". She tends toward constipation normally, but then has had a few of these episodes. She inquires about what she can use to improve bowel regularity.      Cancer History:   Oncology/Hematology History   Endometrial cancer (HCC)   10/19/2022 Initial Diagnosis    Referred by Dr. Chel Mchugh    9/28/2022: Endometrial curettings with focal FIGO grade 1 endometrioid adenocarcinoma in a background of complex atypical hyperplasia.     11/4/2022 Surgery    RTLH/BSO and bilateral pelvic sentinel lymph node dissection with Dr. Melanie Kent at [facility].    Surgical pathology showed 2.5 x 2 x 1 cm grade 2 endometrioid tumor, invasive into 25% of the myometrium. No lymphvascular invasion. Nodes and all other structures negative. MSI by IHC showed absent MLH1 and PMS2. 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  Stage IA grade 2     3/7/2023 Survivorship    Survivorship Care Plan completed and discussed with patient.  Copy of Survivorship Care Plan provided to patient and primary care provider.         The current medication list and allergy list were reviewed and reconciled.     Past Medical History, Past Surgical History, Social History, Family History have been reviewed and are without significant changes except as mentioned.        Review of Systems   Constitutional: Negative.  " "  Gastrointestinal: Positive for diarrhea (few occurences, see HPI).   Genitourinary: Negative.        Objective   Physical Exam  Vital Signs: /57   Pulse 58   Temp 97.1 °F (36.2 °C) (Temporal)   Resp 18   Ht 167.6 cm (66\")   Wt 99.3 kg (218 lb 14.4 oz)   SpO2 96%   BMI 35.33 kg/m²   Vitals:    03/07/23 1031   PainSc: 0-No pain           General Appearance:  alert, cooperative, no apparent distress, appears stated age and obese by BMI criteria   Neurologic/Psychiatric: A&O x 3, gait steady, appropriate affect   Abdomen:   Soft, non-tender, non-distended and no organomegaly   Extremities: Normal, atraumatic; no clubbing, cyanosis, or edema    Pelvic: deferred     ECOG score: 0             Survivorship Needs Assessment:  Nutrition Services  Health history, treatment course, and weight trends reviewed. Discussed nutrition services offered by [facility] including oncology nutrition, diabetes management, general outpatient nutrition, exercise counseling, and weight management. The patient is not in need of referral to nutrition services at this time.     PT/Rehab  Health history, treatment course, and current status. The patient is not in need of physical therapy or rehabilitation services.    Behavioral Health  A post-treatment depression screening has been completed. PHQ-9 results show 0 (No Depression). The patient has not previously established mental health care. A referral is not recommended at this time.       Procedure Note:            Assessment and Plan:     Diagnoses and all orders for this visit:    1. History of endometrial cancer (Primary)    2. Fecal urgency        The patient and I have reviewed her Survivorship Care Plan in detail. We discussed her diagnosis, pathology, histology, all treatments, and ongoing surveillance recommendations. Plan for every 6 month clinic visits with pelvic exams for the first 2 years. All questions were answered to her satisfaction. She is in agreement with our " plan for ongoing surveillance as outlined in her plan. A copy of this document was provided to her at the completion of our visit.  A copy has also been sent to her primary care provider.    Patient and I discussed her increased bowel urgency and few occurrences of fecal incontinence since her surgery. She describes a history of similar symptoms after taking antibiotics in the past. I recommended she begin a daily probiotic for at least 1 month. If symptoms worsen or persist, consider referral to GI. She v/u.    Pain assessment was performed today as a part of patient’s care. For patients with pain related to surgery, gynecologic malignancy or cancer treatment, the plan is as noted in the assessment/plan.  For patients with pain not related to these issues, they are to seek any further needed care from a more appropriate provider, such as PCP.      I spent 40 minutes caring for Charanjit on this date of service. This time includes time spent by me in the following activities: preparing for the visit, obtaining and/or reviewing a separately obtained history, counseling and educating the patient/family/caregiver, documenting information in the medical record and care coordination.       Follow Up   Return to clinic in 3 months for ongoing cancer surveillance with Dr. Kent.      Electronically signed by GUY Matthews on 03/07/23 at 12:59 EST

## 2023-05-15 ENCOUNTER — OFFICE VISIT (OUTPATIENT)
Dept: GYNECOLOGIC ONCOLOGY | Facility: CLINIC | Age: 68
End: 2023-05-15
Payer: MEDICARE

## 2023-05-15 VITALS
HEART RATE: 56 BPM | SYSTOLIC BLOOD PRESSURE: 133 MMHG | OXYGEN SATURATION: 97 % | DIASTOLIC BLOOD PRESSURE: 60 MMHG | BODY MASS INDEX: 35.07 KG/M2 | TEMPERATURE: 98.1 F | WEIGHT: 218.2 LBS | RESPIRATION RATE: 18 BRPM | HEIGHT: 66 IN

## 2023-05-15 DIAGNOSIS — L90.0 LICHEN SCLEROSUS: ICD-10-CM

## 2023-05-15 DIAGNOSIS — N95.2 VAGINAL ATROPHY: ICD-10-CM

## 2023-05-15 DIAGNOSIS — Z85.42 HISTORY OF UTERINE CANCER: Primary | ICD-10-CM

## 2023-05-15 RX ORDER — ESTRADIOL 0.1 MG/G
2 CREAM VAGINAL 2 TIMES WEEKLY
Qty: 42.5 G | Refills: 2 | Status: SHIPPED | OUTPATIENT
Start: 2023-05-15

## 2023-05-15 NOTE — PROGRESS NOTES
Follow Up Office Visit      Patient Name: Charanjit Cole  : 1955   MRN: [id number]     Chief Complaint:  Follow up, history of endometrial cancer    History of Present Illness: Charanjit Cole is a 67 y.o. female who is here today to follow up with Gynecologic Oncology for a history of endometrial cancer. Today she feels well. Tolerating PO, no nausea or vomiting. Denies any abdominal pain, distension, or vaginal bleeding. Having regular bowel and bladder function. [place] She has some vaginal dryness and has been using steroid cream.     Oncologic History:  Oncology/Hematology History   Endometrial cancer   10/19/2022 Initial Diagnosis    Referred by Dr. Chel Mchugh    2022: Endometrial curettings with focal FIGO grade 1 endometrioid adenocarcinoma in a background of complex atypical hyperplasia.     2022 Surgery    RTLH/BSO and bilateral pelvic sentinel lymph node dissection with Dr. Melanie Kent at [facility].    Surgical pathology showed 2.5 x 2 x 1 cm grade 2 endometrioid tumor, invasive into 25% of the myometrium. No lymphvascular invasion. Nodes and all other structures negative. MSI by IHC showed absent MLH1 and PMS2. 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  Stage IA grade 2     3/7/2023 Survivorship    Survivorship Care Plan completed and discussed with patient.  Copy of Survivorship Care Plan provided to patient and primary care provider.          I have reviewed and the following portions of the patient's history were updated as appropriate: past family history, past medical history, past social history, past surgical history, past obstetrics/gynecologic history and problem list.    Medications: The current medication list was reviewed with the patient and updated in the EMR this date per the Medical Assistant. Medication dosages and frequencies were confirmed to be accurate.      Allergies:   No Known Allergies    Objective     Physical  "Exam:  Vital Signs:   Vitals:    05/15/23 1129   BP: 133/60   Pulse: 56   Resp: 18   Temp: 98.1 °F (36.7 °C)   TempSrc: Infrared   SpO2: 97%   Weight: 99 kg (218 lb 3.2 oz)   Height: 167.6 cm (66\")   PainSc: 0-No pain     BMI: Body mass index is 35.22 kg/m².   ECOG score: 0           PHQ-2 Depression Screening  Little interest or pleasure in doing things? 0-->not at all   Feeling down, depressed, or hopeless? 0-->not at all   PHQ-2 Total Score 0     Physical Examination:   General appearance - alert, well appearing, and in no distress and oriented to person, place, and time  Mental status - normal mood, behavior, speech, dress, motor activity, and thought processes  Eyes - sclera anicteric, left eye normal, right eye normal  Ears - right ear normal, left ear normal  Nose - mask  Mouth - mask  Lymphatics - no palpable lymphadenopathy, no hepatosplenomegaly  Lungs - normal respiratory effort, clear to auscultation  Heart - normal rate, regular rhythm, normal S1, S2, no murmurs, rubs, clicks or gallops  Abdomen - soft, nontender, nondistended, no masses or organomegaly  Pelvic - external genitalia normal, vagina without discharge, vaginal cuff intact and without lesions, cervix, uterus and adnexa surgically absent, no palpable masses  Neurological - alert, oriented, normal speech, no focal findings or movement disorder noted  Musculoskeletal - no joint tenderness, deformity or swelling  Extremities - peripheral pulses normal, no pedal edema, no clubbing or cyanosis  Skin - normal coloration and turgor, no rashes, no suspicious skin lesions noted     Assessment / Plan    Charanjit Cole is a 67 y.o. with a history of a stage IA, grade 2 endometrial cancer s/p surgery (treatment completed in 11/2022).  She is currently without clinical evidence of disease. Signs of recurrent disease, such as vaginal bleeding, persistent abdominopelvic pain, urinary or bowel changes, and shortness of breath were reviewed.  She was advised " to follow up immediately if she develops any of the above symptoms. She will follow-up in 6 months.    Vaginal dryness due to atrophy and lichen sclerosis: encouraged to continue steroid cream, offered estrogen cream which patient was amenable to trying.     Health maintenance: She was reminded to maintain a healthy lifestyle with a well balanced diet, calcium and vitamin D for osteoporosis prevention, and exercise as well as continue with recommended health and cancer screening guidelines.     Diagnoses and all orders for this visit:    1. History of uterine cancer (Primary)    2. Vaginal atrophy    3. Lichen sclerosus    Other orders  -     estradiol (ESTRACE VAGINAL) 0.1 MG/GM vaginal cream; Insert 2 g into the vagina 2 (Two) Times a Week.  Dispense: 42.5 g; Refill: 2         Follow Up:   In 6 months for surveillance    Mary Almonte MD   Gynecologic Oncology Resident    Patient was seen and examined with Dr. Almonte,  resident, who performed portions of the examination and documentation for this patient's care under my direct supervision.  I agree with the above documentation and plan.    Melanie Kent MD  Gynecologic Oncology

## 2023-11-10 NOTE — PROGRESS NOTES
Follow Up Office Visit      Patient Name: Charanjit Cole  : 1955   MRN: [id number]     Chief Complaint:  Follow up, history of endometrial cancer    History of Present Illness: Charanjit Cole is a 68 y.o. female who is here today to follow up with Gynecologic Oncology for a history of endometrial cancer. Today, she reports significant issues with constipation and hemorrhoids. She has to strain with all bowel movements. She now has discomfort and itching around her anus. She denies vaginal bleeding and discharge. She does not have abdominal or pelvic pain. She is tolerating a regular diet and endorses a normal appetite. She denies nausea and vomiting. She denies early satiety and bloating. Denies any CP, SOB, lightheadedness or dizziness. She denies changes in her bladder. No dysuria, frequency, urgency, hematuria or flank pain. Reports normal energy levels.      Oncologic History:  Oncology/Hematology History   Endometrial cancer   10/19/2022 Initial Diagnosis    Referred by Dr. Chel Mchugh    2022: Endometrial curettings with focal FIGO grade 1 endometrioid adenocarcinoma in a background of complex atypical hyperplasia.     2022 Surgery    RTLH/BSO and bilateral pelvic sentinel lymph node dissection with Dr. Melanie Kent at [facility].    Surgical pathology showed 2.5 x 2 x 1 cm grade 2 endometrioid tumor, invasive into 25% of the myometrium. No lymphvascular invasion. Nodes and all other structures negative. MSI by IHC showed absent MLH1 and PMS2. 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  Stage IA grade 2     3/7/2023 Survivorship    Survivorship Care Plan completed and discussed with patient.  Copy of Survivorship Care Plan provided to patient and primary care provider.          I have reviewed and the following portions of the patient's history were updated as appropriate: past family history, past medical history, past social history, past surgical history, past  "obstetrics/gynecologic history and problem list.    Medications: The current medication list was reviewed with the patient and updated in the EMR this date per the Medical Assistant. Medication dosages and frequencies were confirmed to be accurate.      Allergies:   No Known Allergies    Objective     Physical Exam:  Vital Signs:   Vitals:    11/16/23 1006   BP: 155/65   Pulse: 60   Resp: 18   Temp: 97.1 °F (36.2 °C)   TempSrc: Infrared   SpO2: 93%   Weight: 97.9 kg (215 lb 14.4 oz)   Height: 167.6 cm (66\")   PainSc: 0-No pain       BMI: Body mass index is 34.85 kg/m².   ECOG score: 0           PHQ-2 Depression Screening  Little interest or pleasure in doing things?     Feeling down, depressed, or hopeless?     PHQ-2 Total Score       Physical Examination:   General appearance - alert, well appearing, and in no distress and oriented to person, place, and time  Mental status - normal mood, behavior, speech, dress, motor activity, and thought processes  Eyes - sclera anicteric, left eye normal, right eye normal  Ears - right ear normal, left ear normal  Lungs - normal respiratory effort, clear to auscultation  Heart - normal rate, regular rhythm, normal S1, S2, no murmurs, rubs, clicks or gallops  Abdomen - soft, nontender, nondistended, no masses or organomegaly  Pelvic - external genitalia normal, vagina without discharge, vaginal cuff intact and without lesions, cervix, uterus and adnexa surgically absent, no palpable masses, erythema with excoriations and scaling around anus  Neurological - alert, oriented, normal speech, no focal findings or movement disorder noted  Musculoskeletal - no joint tenderness, deformity or swelling  Extremities - peripheral pulses normal, no pedal edema, no clubbing or cyanosis  Skin - normal coloration and turgor, no rashes, no suspicious skin lesions noted     Assessment / Plan    Charanjit Cole is a 68 y.o. with a history of a stage IA, grade 2 endometrial cancer s/p surgery " (treatment completed in 11/2022).  She is currently without clinical evidence of disease. Signs of recurrent disease, such as vaginal bleeding, persistent abdominopelvic pain, urinary or bowel changes, and shortness of breath were reviewed.  She was advised to follow up immediately if she develops any of the above symptoms. She will follow-up in 6 months.    Vaginal dryness due to atrophy and lichen sclerosis: Continue triamcinolone and estrogen cream.  Perianal dermatitis in the setting of defecatory dysfunction: Recommend desitin or other barrier ointment for symptomatic treatment. Recommended patient call her GI doctor for evaluation of her defecatory issues.    Health maintenance: She was reminded to maintain a healthy lifestyle with a well balanced diet, calcium and vitamin D for osteoporosis prevention, and exercise as well as continue with recommended health and cancer screening guidelines.     Diagnoses and all orders for this visit:    1. History of uterine cancer (Primary)    2. Vaginal atrophy    3. Lichen sclerosus    4. Perianal dermatitis    5. Chronic idiopathic constipation           Follow Up:   In 6 months for surveillance    Melanie Kent MD  Gynecologic Oncology

## 2023-11-16 ENCOUNTER — OFFICE VISIT (OUTPATIENT)
Dept: GYNECOLOGIC ONCOLOGY | Facility: CLINIC | Age: 68
End: 2023-11-16
Payer: MEDICARE

## 2023-11-16 VITALS
OXYGEN SATURATION: 93 % | HEIGHT: 66 IN | SYSTOLIC BLOOD PRESSURE: 155 MMHG | BODY MASS INDEX: 34.7 KG/M2 | WEIGHT: 215.9 LBS | HEART RATE: 60 BPM | DIASTOLIC BLOOD PRESSURE: 65 MMHG | TEMPERATURE: 97.1 F | RESPIRATION RATE: 18 BRPM

## 2023-11-16 DIAGNOSIS — K59.04 CHRONIC IDIOPATHIC CONSTIPATION: ICD-10-CM

## 2023-11-16 DIAGNOSIS — N95.2 VAGINAL ATROPHY: ICD-10-CM

## 2023-11-16 DIAGNOSIS — L90.0 LICHEN SCLEROSUS: ICD-10-CM

## 2023-11-16 DIAGNOSIS — L30.9 PERIANAL DERMATITIS: ICD-10-CM

## 2023-11-16 DIAGNOSIS — Z85.42 HISTORY OF UTERINE CANCER: Primary | ICD-10-CM

## 2024-05-22 ENCOUNTER — OFFICE VISIT (OUTPATIENT)
Dept: GYNECOLOGIC ONCOLOGY | Facility: CLINIC | Age: 69
End: 2024-05-22
Payer: MEDICARE

## 2024-05-22 VITALS
OXYGEN SATURATION: 93 % | RESPIRATION RATE: 18 BRPM | HEIGHT: 66 IN | TEMPERATURE: 97.3 F | SYSTOLIC BLOOD PRESSURE: 144 MMHG | BODY MASS INDEX: 34.34 KG/M2 | DIASTOLIC BLOOD PRESSURE: 52 MMHG | HEART RATE: 51 BPM | WEIGHT: 213.7 LBS

## 2024-05-22 DIAGNOSIS — N95.2 VAGINAL ATROPHY: ICD-10-CM

## 2024-05-22 DIAGNOSIS — Z85.42 HISTORY OF UTERINE CANCER: Primary | ICD-10-CM

## 2024-05-22 DIAGNOSIS — L30.9 PERIANAL DERMATITIS: ICD-10-CM

## 2024-05-22 DIAGNOSIS — L90.0 LICHEN SCLEROSUS: ICD-10-CM

## 2024-05-22 RX ORDER — CLOBETASOL PROPIONATE 0.5 MG/G
1 OINTMENT TOPICAL 2 TIMES DAILY
Qty: 60 G | Refills: 1 | Status: SHIPPED | OUTPATIENT
Start: 2024-05-22

## 2024-05-22 NOTE — PROGRESS NOTES
"GYN ONCOLOGY CANCER SURVEILLANCE FOLLOW-UP    Charanjit Cole  [id number]  1955    Subjective   Chief Complaint: Follow up, history of endometrial cancer      History of present illness:   Charanjit Cole is a 68 y.o. year old female who is here today for ongoing surveillance of Endometrial Cancer, see Cancer History. She lives in [facility]. She is accompanied by a friend this visit. Overall, she is doing well and her only concern at this time is ongoing discomfort/ irritation in her saul area. She has h/o lichens sclerosis and vaginal atrophy for which she continues treatment with topical triamcinolone and estrogen. Describes that now there is even blood on the toilet tissue after wiping. She denies vaginal bleeding, persistent abdominopelvic pain, urinary or bowel changes, and shortness of breath.  The last couple visits here various GI symptoms were noted including perianal itching, bowel changes, chronic constipation, and incontinence of stool. Today she notes that all GI sxs remain completely resolved and for the first time in her life her bowels are moving regularly each day. Last colonoscopy was completed 10/2022 by Dr. Oneill showing hemorrhoids and diverticulosis, no specimens were collected.  She was noted to have iron deficiency at this time.  Recommendations to repeat in 5 years.  Additionally, she complains of general malaise but is recently worsening. Reports \"I feel wiped\" with zero energy at all times. She is scheduled for f/u with her PCP, Dr Lantigua, in a couple weeks who is currently managing B12 injections q2 weeks. She does follow with a local dermatologist.          Cancer History:   Oncology/Hematology History   Endometrial cancer   10/19/2022 Initial Diagnosis    Referred by Dr. Chel Mchugh    9/28/2022: Endometrial curettings with focal FIGO grade 1 endometrioid adenocarcinoma in a background of complex atypical hyperplasia.     11/4/2022 Surgery    RTLH/BSO and bilateral pelvic " "sentinel lymph node dissection with Dr. Melanie Kent at [facility].    Surgical pathology showed 2.5 x 2 x 1 cm grade 2 endometrioid tumor, invasive into 25% of the myometrium. No lymphvascular invasion. Nodes and all other structures negative. MSI by IHC showed absent MLH1 and PMS2. 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  Stage IA grade 2     3/7/2023 Survivorship    Survivorship Care Plan completed and discussed with patient.  Copy of Survivorship Care Plan provided to patient and primary care provider.           The current medication list and allergy list were reviewed and reconciled.     Past Medical History, Past Surgical History, Social History, Family History have been reviewed and are without significant changes except as mentioned.      Review of Systems   Constitutional:  Positive for fatigue. Negative for activity change, appetite change, chills, diaphoresis, fever, unexpected weight gain and unexpected weight loss.   Gastrointestinal: Negative.         Perianal itching, otherwise negative GI ROS   Genitourinary: Negative.    Skin:         Irritatated skin of vulva and perineal area .    Psychiatric/Behavioral: Negative.             Objective   Physical Exam  Exam conducted with a chaperone present.   Genitourinary:     Labia:         Right: Rash and tenderness present.         Left: Rash and tenderness present.       Rectum: Anal fissure present.          Comments: There is erythema with excoriations, moisture, and scaling in area marked   Lymphadenopathy:      Lower Body: No right inguinal adenopathy. No left inguinal adenopathy.       Vital Signs: /52   Pulse 51   Temp 97.3 °F (36.3 °C) (Temporal)   Resp 18   Ht 167.6 cm (66\")   Wt 96.9 kg (213 lb 11.2 oz)   SpO2 93%   BMI 34.49 kg/m²   Vitals:    05/22/24 1149   PainSc:   5   PainLoc: Hip  Comment: bilateral           General Appearance:  alert, cooperative, no apparent distress, appears stated age, and obese by BMI criteria " "  Neurologic/Psych: A&O x 3, gait steady, appropriate affect   HEENT:  Normocephalic, without obvious abnormality, mucous membranes moist   Abdomen:   Soft, non-tender, non-distended, no organomegaly, and Exam limited d/t habitus.   Lymph nodes: No cervical, supraclavicular, inguinal adenopathy noted   Pelvic: External Genitalia  normal; see note above detailing skin changes in this area   Vagina  is pink, moist, without lesions.  and without discharge   Vaginal Cuff  Female Vaginal Cuff: smooth, intact, and without visible lesions  Uterus  surgically absent and no palpable masses  Ovaries  surgically absent bilaterally and without palpable masses or fullness     ECOG score: 1             Result Review :     -last colonoscopy report  -last 2 gyn onc notes       Tumor marker:  No results found for: \"\"    PHQ-9 Total Score: 1    Procedure Note:            Assessment and Plan:     -Charanjit Cole is a 68 y.o. with a history of a stage IA, grade 2 endometrial cancer s/p surgery (treatment completed in 11/2022).  She is currently without clinical evidence of disease. Signs of recurrent disease, such as vaginal bleeding, persistent abdominopelvic pain, urinary or bowel changes, and shortness of breath were reviewed.  She was advised to follow up immediately if she develops any of the above symptoms. She will follow-up in 6 months.     -Vaginal dryness due to atrophy and lichen sclerosis: Continue estrogen cream.  I am going to switch her triamcinolone cream to clobetasol ointment which she will use twice daily. Recommended use of barrier ointment or Desitin for symptomatic treatment, apply after the clobetasol.  I would like for her to return for skin recheck in 3 months.  At that time if symptoms are not an approved we will tentatively plan for biopsy at this time.  If symptoms worsen prior to her 3-month follow-up, I have instructed her not to wait and go ahead with scheduling further evaluation appointment with her " local dermatologist.  Happy to hear that her GI symptoms are improved, but still recommend touching base with her GI specialist as well to rule out any other etiology.     -Health maintenance: She was reminded to maintain a healthy lifestyle with a well balanced diet, calcium and vitamin D for osteoporosis prevention, and exercise as well as continue with recommended health and cancer screening guidelines.       Diagnoses and all orders for this visit:    1. History of uterine cancer (Primary)    2. Lichen sclerosus    3. Vaginal atrophy    4. Perianal dermatitis    Other orders  -     clobetasol (TEMOVATE) 0.05 % ointment; Apply 1 Application topically to the appropriate area as directed 2 (Two) Times a Day.  Dispense: 60 g; Refill: 1      Pain assessment was performed today as a part of patient’s care.  For patients with pain related to surgery, gynecologic malignancy or cancer treatment, the plan is as noted in the assessment/plan.  For patients with pain not related to these issues, they are to seek any further needed care from a more appropriate provider, such as PCP.       Follow-up:    Return to clinic in 6 months for ongoing cancer surveillance.  F/u in 3 months for skin check     Electronically signed by GUY Bates on 05/22/2024

## 2024-08-16 ENCOUNTER — TELEPHONE (OUTPATIENT)
Dept: GYNECOLOGIC ONCOLOGY | Facility: CLINIC | Age: 69
End: 2024-08-16
Payer: MEDICARE

## 2024-08-16 NOTE — TELEPHONE ENCOUNTER
Caller: Charanjit Cole    Relationship to patient: Self    Best call back number: [phone]    Chief complaint: RESCHEDULE    Type of visit: FOLLOW UP     Requested date: 8-27     If rescheduling, when is the original appointment: 8-22     Additional notes:PLEASE ADVISE

## 2024-08-28 ENCOUNTER — OFFICE VISIT (OUTPATIENT)
Dept: GYNECOLOGIC ONCOLOGY | Facility: CLINIC | Age: 69
End: 2024-08-28
Payer: MEDICARE

## 2024-08-28 VITALS
TEMPERATURE: 97 F | DIASTOLIC BLOOD PRESSURE: 67 MMHG | OXYGEN SATURATION: 95 % | SYSTOLIC BLOOD PRESSURE: 154 MMHG | RESPIRATION RATE: 18 BRPM | BODY MASS INDEX: 34.23 KG/M2 | WEIGHT: 213 LBS | HEART RATE: 58 BPM | HEIGHT: 66 IN

## 2024-08-28 DIAGNOSIS — K64.9 HEMORRHOIDS, UNSPECIFIED HEMORRHOID TYPE: ICD-10-CM

## 2024-08-28 DIAGNOSIS — N95.2 VAGINAL ATROPHY: Primary | ICD-10-CM

## 2024-08-28 DIAGNOSIS — Z85.42 HISTORY OF UTERINE CANCER: ICD-10-CM

## 2024-08-28 DIAGNOSIS — L90.0 LICHEN SCLEROSUS: ICD-10-CM

## 2024-08-28 PROCEDURE — 1160F RVW MEDS BY RX/DR IN RCRD: CPT | Performed by: NURSE PRACTITIONER

## 2024-08-28 PROCEDURE — 1126F AMNT PAIN NOTED NONE PRSNT: CPT | Performed by: NURSE PRACTITIONER

## 2024-08-28 PROCEDURE — 1159F MED LIST DOCD IN RCRD: CPT | Performed by: NURSE PRACTITIONER

## 2024-08-28 PROCEDURE — 99213 OFFICE O/P EST LOW 20 MIN: CPT | Performed by: NURSE PRACTITIONER

## 2024-08-28 RX ORDER — CARVEDILOL 25 MG/1
25 TABLET ORAL
COMMUNITY

## 2024-08-28 RX ORDER — SIMVASTATIN 40 MG
TABLET ORAL
COMMUNITY
Start: 2024-06-04

## 2024-08-28 RX ORDER — GLIPIZIDE 10 MG/1
TABLET ORAL
COMMUNITY
Start: 2024-08-27

## 2024-08-28 RX ORDER — DOXYCYCLINE 100 MG/1
CAPSULE ORAL
COMMUNITY
Start: 2024-06-10

## 2024-08-28 RX ORDER — HYDROCORTISONE ACETATE 25 MG/1
25 SUPPOSITORY RECTAL 2 TIMES DAILY PRN
Qty: 60 EACH | Refills: 5 | Status: SHIPPED | OUTPATIENT
Start: 2024-08-28

## 2024-08-28 RX ORDER — DAPAGLIFLOZIN 10 MG/1
TABLET, FILM COATED ORAL
COMMUNITY
Start: 2024-05-31

## 2024-08-28 RX ORDER — ESTRADIOL 0.1 MG/G
1 CREAM VAGINAL 2 TIMES WEEKLY
Qty: 42.5 G | Refills: 5 | Status: SHIPPED | OUTPATIENT
Start: 2024-08-29

## 2024-08-28 RX ORDER — LOSARTAN POTASSIUM AND HYDROCHLOROTHIAZIDE 12.5; 5 MG/1; MG/1
TABLET ORAL
COMMUNITY
Start: 2024-04-28

## 2024-08-28 RX ORDER — PREDNISONE 10 MG/1
TABLET ORAL
COMMUNITY
Start: 2024-06-10

## 2024-08-28 RX ORDER — LORATADINE AND PSEUDOEPHEDRINE SULFATE 10; 240 MG/1; MG/1
TABLET, FILM COATED, EXTENDED RELEASE ORAL
COMMUNITY
Start: 2024-06-10

## 2024-08-28 RX ORDER — PREGABALIN 50 MG/1
CAPSULE ORAL
COMMUNITY
Start: 2024-08-16

## 2024-08-28 RX ORDER — CYANOCOBALAMIN 1000 UG/ML
INJECTION, SOLUTION INTRAMUSCULAR; SUBCUTANEOUS
COMMUNITY
Start: 2024-08-27

## 2024-08-28 RX ORDER — MONTELUKAST SODIUM 10 MG/1
10 TABLET ORAL
COMMUNITY

## 2024-08-28 NOTE — PROGRESS NOTES
GYN ONCOLOGY FOLLOW-UP    Charanjit Cole  [id number]  1955    Subjective   Chief Complaint: History of uterine cancer        History of present illness:   Charanjit Cole is a 68 y.o. year old female who is here today for follow up to re-check skin and potential biopsy. She was seen by me for her regularly scheduled surveillance visit on 5-22-24. See note from this date for additional details. At this time symptoms of vaginal atrophy and lichens sclerosis were uncontrolled and causing discomfort.  At that time, I instructed to continue vaginal estrogen cream as previously prescribed and switched her triamcinolone cream to clobetasol ointment to be used BID.  Since last visit she has not seen her dermatologist (Dr Ivory at [facility]) or her GI specialist.   Today, she is accompanied by a friend for apt and I am very happy to hear that she has experienced significant improvement with symptoms since switching topical steroid. I inquired about use of estradiol and pt states that she actually has not been using topical HRT as she was never able to pick it up from pharmacy before. She also notes ongoing problems with hemorrhoids. Has been using OTC prep-H with no improvements-- also believes her hemorrhoids are internal. Fatigue and malaise are persistent and unchanged.  Charanjit reports that she is scheduled for follow-up with her PCP for additional workup.     Oncology History:    Oncology/Hematology History   Endometrial cancer   10/19/2022 Initial Diagnosis    Referred by Dr. Chel Mchugh    9/28/2022: Endometrial curettings with focal FIGO grade 1 endometrioid adenocarcinoma in a background of complex atypical hyperplasia.     11/4/2022 Surgery    RTLH/BSO and bilateral pelvic sentinel lymph node dissection with Dr. Melanie Kent at [facility].    Surgical pathology showed 2.5 x 2 x 1 cm grade 2 endometrioid tumor, invasive into 25% of the myometrium. No lymphvascular invasion. Nodes and all other  "structures negative. MSI by IHC showed absent MLH1 and PMS2. 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  Stage IA grade 2     3/7/2023 Survivorship    Survivorship Care Plan completed and discussed with patient.  Copy of Survivorship Care Plan provided to patient and primary care provider.           The current medication list and allergy list were reviewed and reconciled.     Past Medical History, Past Surgical History, Social History, Family History have been reviewed and are without significant changes except as mentioned.          Objective   Physical Exam  Exam conducted with a chaperone present.   Genitourinary:     Comments: There is still some mild erythema present along bilateral labia majora but overall skin is much improved compared to last visit. The skin irritation no longer extends into the perineal/ rectal area and overall is less angry appearing.   Lymphadenopathy:      Lower Body: No right inguinal adenopathy. No left inguinal adenopathy.       Vital Signs: /67   Pulse 58   Temp 97 °F (36.1 °C) (Temporal)   Resp 18   Ht 167.6 cm (66\")   Wt 96.6 kg (213 lb)   SpO2 95%   BMI 34.38 kg/m²   Vitals:    08/28/24 1313   PainSc: 0-No pain           General Appearance:  alert, cooperative, no apparent distress and appears stated age   Neurologic/Psych: A&O x 3, gait steady, appropriate affect   HEENT:  Normocephalic, without obvious abnormality, mucous membranes moist   Breasts:  deferred   Abdomen:   Soft, non-tender, non-distended, and no organomegaly   Lymph nodes: No cervical, supraclavicular, inguinal or axillary adenopathy noted   Extremities: Normal, atraumatic; no clubbing, cyanosis, or edema    Pelvic: External Genitalia  see above   Vagina  is pale, atrophic.   Vaginal Cuff  Female Vaginal Cuff: smooth, intact, and without visible lesions  Uterus  surgically absent and no palpable masses  Ovaries  surgically absent bilaterally and without palpable masses or fullness  Parametria  " "smooth     ECOG score: 1               Result Review :    Last imaging study was 8-3-22.   Tumor marker:  No results found for: \"\"    Procedure Notes:              Assessment and Plan:    -Very pleased at the improvement in her skin symptoms.  Continue clobetasol as previously prescribed.  I do additionally recommend that she implement estradiol cream vaginally.  New Rx sent to pharmacy.  Instructed patient to call the office if she has any issues, for what ever reason, picking up this Rx.  -Recommended steroid suppository for hemorrhoid treatment.  Anusol Rx sent.  Advised this does not replace a consult with your GI specialist and you should still see them for f/u and eval of ongoing GI sxs. Repeat colonoscopy may be indicated to repeat sooner than 10/2027.   -Follow-up with PCP as scheduled for additional workup of generalized fatigue and malaise.   -We will move her follow-up out from Nov to 2/2025, but she was cautioned to have low threshold for return sooner than that time for any concerning symptoms or changes.    Diagnoses and all orders for this visit:    1. Vaginal atrophy (Primary)  -     estradiol (ESTRACE VAGINAL) 0.1 MG/GM vaginal cream; Insert 1 g into the vagina 2 (Two) Times a Week.  Dispense: 42.5 g; Refill: 5    2. Hemorrhoids, unspecified hemorrhoid type  -     hydrocortisone (ANUSOL-HC) 25 MG suppository; Insert 1 suppository into the rectum 2 (Two) Times a Day As Needed for Hemorrhoids.  Dispense: 60 each; Refill: 5    3. History of uterine cancer    4. Lichen sclerosus        Pain assessment was performed today as a part of patient’s care.  For patients with pain related to surgery, gynecologic malignancy or cancer treatment, the plan is as noted in the assessment/plan.  For patients with pain not related to these issues, they are to seek any further needed care from a more appropriate provider, such as PCP.      Follow-up:   Return to clinic in 6 months for ongoing cancer " surveillance.      Electronically signed by GUY Bates on 08/28/2024

## 2025-02-21 ENCOUNTER — OFFICE VISIT (OUTPATIENT)
Dept: GYNECOLOGIC ONCOLOGY | Facility: CLINIC | Age: 70
End: 2025-02-21
Payer: MEDICARE

## 2025-02-21 VITALS
OXYGEN SATURATION: 97 % | HEART RATE: 55 BPM | BODY MASS INDEX: 34.87 KG/M2 | WEIGHT: 217 LBS | DIASTOLIC BLOOD PRESSURE: 59 MMHG | TEMPERATURE: 97.3 F | RESPIRATION RATE: 18 BRPM | HEIGHT: 66 IN | SYSTOLIC BLOOD PRESSURE: 144 MMHG

## 2025-02-21 DIAGNOSIS — Z85.42 HISTORY OF UTERINE CANCER: Primary | ICD-10-CM

## 2025-02-21 DIAGNOSIS — L90.0 LICHEN SCLEROSUS: ICD-10-CM

## 2025-02-21 DIAGNOSIS — N95.2 VAGINAL ATROPHY: ICD-10-CM

## 2025-02-21 RX ORDER — LANCETS 33 GAUGE
EACH MISCELLANEOUS
COMMUNITY
Start: 2025-02-06

## 2025-02-21 RX ORDER — INSULIN GLARGINE 100 [IU]/ML
INJECTION, SOLUTION SUBCUTANEOUS
COMMUNITY
Start: 2025-02-06

## 2025-02-21 RX ORDER — BLOOD SUGAR DIAGNOSTIC
STRIP MISCELLANEOUS
COMMUNITY
Start: 2025-02-06

## 2025-02-21 RX ORDER — BLOOD-GLUCOSE METER
EACH MISCELLANEOUS
COMMUNITY
Start: 2025-02-06

## 2025-02-21 RX ORDER — CLOBETASOL PROPIONATE 0.5 MG/G
1 OINTMENT TOPICAL 2 TIMES DAILY
Qty: 60 G | Refills: 1 | Status: SHIPPED | OUTPATIENT
Start: 2025-02-21

## 2025-02-21 RX ORDER — ESTRADIOL 0.1 MG/G
1 CREAM VAGINAL 2 TIMES WEEKLY
Qty: 42.5 G | Refills: 5 | Status: SHIPPED | OUTPATIENT
Start: 2025-02-24

## 2025-02-21 NOTE — PROGRESS NOTES
GYN ONCOLOGY CANCER SURVEILLANCE FOLLOW-UP    Charanjit Cole  [id number]  1955    Subjective   Chief Complaint: 6m f/u, h/o uterine ca      History of present illness:   Charanjit Cole is a 69 y.o. year old female who is here today for ongoing surveillance of endometrial cancer, see cancer history.  She lives in [facility].  -Her friend again accompanies her to appointment today.  They have been shopping as they usually do while visiting here in [place].  Came from [place].  They are heading back to [place] after visit complete.  -She does follow routinely with Alex Ivory, of dermatology but notes that he does not have him examine her perineal area.  -alt topical steroid with much improvement in symptoms.  -Has not been using topical estradiol for vaginal atrophy-- was unaware she had been prescribed 2 different topicals.  -She has a new diagnosis of DM2 since last visit.  Now on insulin.  Learning manage this chronic disease.  Her A1c was previously greater than 10. They have not yet rechecked.   -She has not followed up with or done anything about bowel changes or seeing GI/colorectal group for repeat colonoscopy since her last visit and patient said she knows and she is aware she was supposed to have done this. She also reports ongoing intermittent urinary sxs.   -Last colonoscopy was completed 10/2022 by Dr. Oneill showing hemorrhoids and diverticulosis, no specimens were collected.  She was noted to have iron deficiency at this time.  Recommendations to repeat in 5 years.       Cancer History:   Oncology/Hematology History   Endometrial cancer   10/19/2022 Initial Diagnosis    Referred by Dr. Chel Mchugh    9/28/2022: Endometrial curettings with focal FIGO grade 1 endometrioid adenocarcinoma in a background of complex atypical hyperplasia.     11/4/2022 Surgery    RTLH/BSO and bilateral pelvic sentinel lymph node dissection with Dr. Melanie Kent at [facility].    Surgical pathology showed  "2.5 x 2 x 1 cm grade 2 endometrioid tumor, invasive into 25% of the myometrium. No lymphvascular invasion. Nodes and all other structures negative. MSI by IHC showed absent MLH1 and PMS2. 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  Stage IA grade 2     3/7/2023 Survivorship    Survivorship Care Plan completed and discussed with patient.  Copy of Survivorship Care Plan provided to patient and primary care provider.           The current medication list and allergy list were reviewed and reconciled.     Past Medical History, Past Surgical History, Social History, Family History have been reviewed and are without significant changes except as mentioned.      Review of Systems   Constitutional: Negative.    Gastrointestinal: Negative.    Genitourinary: Negative.    Psychiatric/Behavioral: Negative.             Objective   Physical Exam  Vital Signs: /59   Pulse 55   Temp 97.3 °F (36.3 °C) (Temporal)   Resp 18   Ht 167.6 cm (65.98\")   Wt 98.4 kg (217 lb)   SpO2 97%   BMI 35.04 kg/m²   Vitals:    02/21/25 1256   PainSc: 0-No pain           General Appearance:  alert, cooperative, no apparent distress, appears stated age, and obese by BMI criteria   Neurologic/Psych: A&O x 3, gait steady, appropriate affect   HEENT:  Normocephalic, without obvious abnormality, mucous membranes moist   Abdomen:   Soft, non-tender, non-distended, and no organomegaly   Lymph nodes: No cervical, supraclavicular, inguinal adenopathy noted   Pelvic: External Genitalia  without any concerning lesions. Changes consistent with LS are present and similar to prev visit.   Vagina  is pink, moist, without lesions.   Vaginal Cuff  Female Vaginal Cuff: smooth, intact, and without visible lesions  Uterus  surgically absent and no palpable masses  Ovaries  surgically absent bilaterally and without palpable masses or fullness  Parametria  smooth  Rectovaginal  Female rectovaginal: deferred     ECOG score: 1             Assessment and " Plan:     Charanjit Cole is a 68 y.o. with a history of a stage IA, grade 2 endometrial cancer s/p surgery (treatment completed in 11/2022).  She is currently without clinical evidence of disease. Signs of recurrent disease, such as vaginal bleeding, persistent abdominopelvic pain, urinary or bowel changes, and shortness of breath were reviewed.  She was advised to follow up immediately if she develops any of the above symptoms.  Patient inquiring about spacing out surveillance visits.  As she is now over 2 years out since completing treatment this is reasonable. Pt very excited about this. She will follow-up in 12 months.     -Follow-up with PCP and all specialist as scheduled  -Refills sent to pharmacy, continue as previously prescribed  -Discussed pelvic floor PT for pelvic floor dysfunction which I believe would greatly improve all of her symptoms of GSM from urinary to vaginal to bowel changes and constipation.  She politely declines.    Diagnoses and all orders for this visit:    1. History of uterine cancer (Primary)    2. Vaginal atrophy  -     estradiol (ESTRACE VAGINAL) 0.1 MG/GM vaginal cream; Insert 1 g into the vagina 2 (Two) Times a Week.  Dispense: 42.5 g; Refill: 5    3. Lichen sclerosus  -     clobetasol (TEMOVATE) 0.05 % ointment; Apply 1 Application topically to the appropriate area as directed 2 (Two) Times a Day.  Dispense: 60 g; Refill: 1      Pain assessment was performed today as a part of patient’s care.  For patients with pain related to surgery, gynecologic malignancy or cancer treatment, the plan is as noted in the assessment/plan.  For patients with pain not related to these issues, they are to seek any further needed care from a more appropriate provider, such as PCP.    Follow-up:     Return to clinic in 1 year for ongoing cancer surveillance.      Electronically signed by GUY Bates on 02/21/2025         Alert-The patient is alert, awake and responds to voice. The patient is oriented to time, place, and person. The triage nurse is able to obtain subjective information.

## (undated) DEVICE — BLANKT WARM UPPR/BDY ARM/OUT 57X196CM

## (undated) DEVICE — CANNULA SEAL

## (undated) DEVICE — GLV SURG SENSICARE W/ALOE PF LF 6.5 STRL

## (undated) DEVICE — PK MAJ GYN DAVINCI 10

## (undated) DEVICE — PATIENT RETURN ELECTRODE, SINGLE-USE, CONTACT QUALITY MONITORING, ADULT, WITH 9FT CORD, FOR PATIENTS WEIGING OVER 33LBS. (15KG): Brand: MEGADYNE

## (undated) DEVICE — MANIP UTER RUMI TP 6.7MMX8CM BLU

## (undated) DEVICE — MANIP UTER RUMI 2 KOH EFFICIENT 3CM BL

## (undated) DEVICE — BLADELESS OBTURATOR: Brand: WECK VISTA

## (undated) DEVICE — TIP COVER ACCESSORY

## (undated) DEVICE — SUT MNCRYL PLS ANTIB UD 3/0 PS2 27IN

## (undated) DEVICE — UNDERGLV SURG BIOGEL INDICAT PF 61/2 GRN

## (undated) DEVICE — ARM DRAPE

## (undated) DEVICE — SHEET, DRAPE, SPLIT, STERILE: Brand: MEDLINE

## (undated) DEVICE — ADHS SKIN PREMIERPRO EXOFIN TOPICAL HI/VISC .5ML

## (undated) DEVICE — SYNCHROSEAL: Brand: DA VINCI ENERGY

## (undated) DEVICE — ENDOPATH PNEUMONEEDLE INSUFFLATION NEEDLES WITH LUER LOCK CONNECTORS 120MM: Brand: ENDOPATH

## (undated) DEVICE — COLUMN DRAPE

## (undated) DEVICE — APPL CHLORAPREP TINTED 26ML TEAL

## (undated) DEVICE — TRENDELENBURG WINGPAD POSITIONING KIT DELUXE - WITHOUT BODY STRAP: Brand: SOULE MEDICAL

## (undated) DEVICE — ANTIBACTERIAL UNDYED BRAIDED (POLYGLACTIN 910), SYNTHETIC ABSORBABLE SUTURE: Brand: COATED VICRYL

## (undated) DEVICE — ANTIBACTERIAL UNDYED BRAIDED (POLYGLACTIN 910), SYNTHETIC ABSORBABLE SURGICAL SUTURE: Brand: COATED VICRYL

## (undated) DEVICE — ST TBG CONN PNEUMOCLEAR EVAC SMOKE HEAT/HUMID

## (undated) DEVICE — SYRINGE, LUER LOCK, 5ML: Brand: MEDLINE